# Patient Record
Sex: FEMALE | Race: WHITE | NOT HISPANIC OR LATINO | Employment: UNEMPLOYED | URBAN - METROPOLITAN AREA
[De-identification: names, ages, dates, MRNs, and addresses within clinical notes are randomized per-mention and may not be internally consistent; named-entity substitution may affect disease eponyms.]

---

## 2018-03-15 ENCOUNTER — HISTORICAL (OUTPATIENT)
Dept: LAB | Facility: HOSPITAL | Age: 33
End: 2018-03-15

## 2018-03-15 LAB
ABS NEUT (OLG): 5.5 X10(3)/MCL (ref 1.5–6.9)
ERYTHROCYTE [DISTWIDTH] IN BLOOD BY AUTOMATED COUNT: 13.1 % (ref 11.5–17)
GROUP & RH: NORMAL
HBV SURFACE AG SERPL QL IA: NEGATIVE
HCT VFR BLD AUTO: 33.5 % (ref 36–48)
HGB BLD-MCNC: 10.9 GM/DL (ref 12–16)
HIV 1+2 AB+HIV1 P24 AG SERPL QL IA: NEGATIVE
MCH RBC QN AUTO: 27 PG (ref 27–34)
MCHC RBC AUTO-ENTMCNC: 32 GM/DL (ref 31–36)
MCV RBC AUTO: 84 FL (ref 80–99)
PLATELET # BLD AUTO: 257 X10(3)/MCL (ref 140–400)
PMV BLD AUTO: 9.1 FL (ref 6.8–10)
RBC # BLD AUTO: 4.01 X10(6)/MCL (ref 4.2–5.4)
T PALLIDUM AB SER QL: NONREACTIVE
WBC # SPEC AUTO: 8.8 X10(3)/MCL (ref 4.5–11.5)

## 2018-07-05 ENCOUNTER — HISTORICAL (OUTPATIENT)
Dept: LAB | Facility: HOSPITAL | Age: 33
End: 2018-07-05

## 2018-07-07 LAB — FINAL CULTURE: NO GROWTH

## 2018-07-11 ENCOUNTER — HISTORICAL (OUTPATIENT)
Dept: LAB | Facility: HOSPITAL | Age: 33
End: 2018-07-11

## 2018-07-11 LAB
ABS NEUT (OLG): 7.6 X10(3)/MCL (ref 1.5–6.9)
ALT SERPL-CCNC: 23 UNIT/L (ref 10–45)
AST SERPL-CCNC: 26 UNIT/L (ref 15–37)
ERYTHROCYTE [DISTWIDTH] IN BLOOD BY AUTOMATED COUNT: 13.7 % (ref 11.5–17)
HBV SURFACE AG SERPL QL IA: NEGATIVE
HCT VFR BLD AUTO: 33.5 % (ref 36–48)
HGB BLD-MCNC: 10.5 GM/DL (ref 12–16)
HIV 1+2 AB+HIV1 P24 AG SERPL QL IA: NEGATIVE
LDH SERPL-CCNC: 200 UNIT/L (ref 112–240)
MCH RBC QN AUTO: 25 PG (ref 27–34)
MCHC RBC AUTO-ENTMCNC: 31 GM/DL (ref 31–36)
MCV RBC AUTO: 81 FL (ref 80–99)
PLATELET # BLD AUTO: 383 X10(3)/MCL (ref 140–400)
PMV BLD AUTO: 9.9 FL (ref 6.8–10)
RBC # BLD AUTO: 4.13 X10(6)/MCL (ref 4.2–5.4)
T PALLIDUM AB SER QL: NONREACTIVE
URATE SERPL-MCNC: 5 MG/DL (ref 2.6–7.2)
WBC # SPEC AUTO: 11.6 X10(3)/MCL (ref 4.5–11.5)

## 2019-09-12 ENCOUNTER — HISTORICAL (OUTPATIENT)
Dept: LAB | Facility: HOSPITAL | Age: 34
End: 2019-09-12

## 2019-09-12 LAB
ABS NEUT (OLG): 2.5 X10(3)/MCL (ref 1.5–6.9)
ALBUMIN SERPL-MCNC: 3.2 GM/DL (ref 3.4–5)
ALBUMIN/GLOB SERPL: 0.8 RATIO
ALP SERPL-CCNC: 56 UNIT/L (ref 30–113)
ALT SERPL-CCNC: 15 UNIT/L (ref 10–45)
AST SERPL-CCNC: 18 UNIT/L (ref 15–37)
BILIRUB SERPL-MCNC: 0.1 MG/DL (ref 0.1–0.9)
BILIRUBIN DIRECT+TOT PNL SERPL-MCNC: <0.05 MG/DL (ref 0–0.3)
BILIRUBIN DIRECT+TOT PNL SERPL-MCNC: >0.05 MG/DL
BUN SERPL-MCNC: 8 MG/DL (ref 10–20)
CALCIUM SERPL-MCNC: 8.9 MG/DL (ref 8–10.5)
CHLORIDE SERPL-SCNC: 106 MMOL/L (ref 100–108)
CO2 SERPL-SCNC: 31 MMOL/L (ref 21–35)
CREAT SERPL-MCNC: 0.79 MG/DL (ref 0.7–1.3)
ERYTHROCYTE [DISTWIDTH] IN BLOOD BY AUTOMATED COUNT: 13.8 % (ref 11.5–17)
GLOBULIN SER-MCNC: 3.8 GM/DL
GLUCOSE SERPL-MCNC: 99 MG/DL (ref 75–116)
HCT VFR BLD AUTO: 35.9 % (ref 36–48)
HGB BLD-MCNC: 11.4 GM/DL (ref 12–16)
MCH RBC QN AUTO: 26 PG (ref 27–34)
MCHC RBC AUTO-ENTMCNC: 32 GM/DL (ref 31–36)
MCV RBC AUTO: 81 FL (ref 80–99)
PLATELET # BLD AUTO: 254 X10(3)/MCL (ref 140–400)
PMV BLD AUTO: 8.4 FL (ref 6.8–10)
POTASSIUM SERPL-SCNC: 3.5 MMOL/L (ref 3.6–5.2)
PROT SERPL-MCNC: 7 GM/DL (ref 6.4–8.2)
RBC # BLD AUTO: 4.42 X10(6)/MCL (ref 4.2–5.4)
SODIUM SERPL-SCNC: 142 MMOL/L (ref 135–145)
WBC # SPEC AUTO: 6.9 X10(3)/MCL (ref 4.5–11.5)

## 2020-05-25 ENCOUNTER — HISTORICAL (OUTPATIENT)
Dept: LAB | Facility: HOSPITAL | Age: 35
End: 2020-05-25

## 2020-05-25 LAB
ALBUMIN SERPL-MCNC: 3.8 GM/DL (ref 3.5–5)
ALBUMIN/GLOB SERPL: 1 RATIO (ref 1.1–2)
ALP SERPL-CCNC: 53 UNIT/L (ref 40–150)
ALT SERPL-CCNC: 18 UNIT/L (ref 0–55)
AST SERPL-CCNC: 23 UNIT/L (ref 5–34)
BILIRUB SERPL-MCNC: 0.4 MG/DL
BILIRUBIN DIRECT+TOT PNL SERPL-MCNC: 0.2 MG/DL (ref 0–0.5)
BILIRUBIN DIRECT+TOT PNL SERPL-MCNC: 0.2 MG/DL (ref 0–0.8)
BUN SERPL-MCNC: 14 MG/DL (ref 7–18.7)
CALCIUM SERPL-MCNC: 9.1 MG/DL (ref 8.4–10.2)
CHLORIDE SERPL-SCNC: 104 MMOL/L (ref 98–107)
CO2 SERPL-SCNC: 24 MMOL/L (ref 22–29)
CREAT SERPL-MCNC: 0.87 MG/DL (ref 0.55–1.02)
GLOBULIN SER-MCNC: 4 GM/DL (ref 2.4–3.5)
GLUCOSE SERPL-MCNC: 106 MG/DL (ref 74–100)
POTASSIUM SERPL-SCNC: 3.5 MMOL/L (ref 3.5–5.1)
PROT SERPL-MCNC: 7.8 GM/DL (ref 6.4–8.3)
SODIUM SERPL-SCNC: 136 MMOL/L (ref 136–145)

## 2020-10-19 ENCOUNTER — HISTORICAL (OUTPATIENT)
Dept: LAB | Facility: HOSPITAL | Age: 35
End: 2020-10-19

## 2020-10-19 LAB
ABS NEUT (OLG): 3 X10(3)/MCL (ref 1.5–6.9)
ALBUMIN SERPL-MCNC: 3.7 GM/DL (ref 3.5–5)
ALBUMIN/GLOB SERPL: 0.8 RATIO (ref 1.1–2)
ALP SERPL-CCNC: 62 UNIT/L (ref 40–150)
ALT SERPL-CCNC: 62 UNIT/L (ref 0–55)
AST SERPL-CCNC: 38 UNIT/L (ref 5–34)
BILIRUB SERPL-MCNC: 0.1 MG/DL
BILIRUBIN DIRECT+TOT PNL SERPL-MCNC: 0 MG/DL (ref 0–0.8)
BILIRUBIN DIRECT+TOT PNL SERPL-MCNC: 0.1 MG/DL (ref 0–0.5)
BUN SERPL-MCNC: 15 MG/DL (ref 7–18.7)
CALCIUM SERPL-MCNC: 9.5 MG/DL (ref 8.4–10.2)
CHLORIDE SERPL-SCNC: 102 MMOL/L (ref 98–107)
CO2 SERPL-SCNC: 30 MMOL/L (ref 22–29)
CREAT SERPL-MCNC: 0.89 MG/DL (ref 0.55–1.02)
ERYTHROCYTE [DISTWIDTH] IN BLOOD BY AUTOMATED COUNT: 12.9 % (ref 11.5–17)
GLOBULIN SER-MCNC: 4.6 GM/DL (ref 2.4–3.5)
GLUCOSE SERPL-MCNC: 92 MG/DL (ref 74–100)
HCT VFR BLD AUTO: 37.2 % (ref 36–48)
HGB BLD-MCNC: 11.8 GM/DL (ref 12–16)
MCH RBC QN AUTO: 29 PG (ref 27–34)
MCHC RBC AUTO-ENTMCNC: 32 GM/DL (ref 31–36)
MCV RBC AUTO: 90 FL (ref 80–99)
PLATELET # BLD AUTO: 370 X10(3)/MCL (ref 140–400)
PMV BLD AUTO: 8.9 FL (ref 6.8–10)
POTASSIUM SERPL-SCNC: 4.5 MMOL/L (ref 3.5–5.1)
PROT SERPL-MCNC: 8.3 GM/DL (ref 6.4–8.3)
RBC # BLD AUTO: 4.12 X10(6)/MCL (ref 4.2–5.4)
SODIUM SERPL-SCNC: 142 MMOL/L (ref 136–145)
WBC # SPEC AUTO: 7.3 X10(3)/MCL (ref 4.5–11.5)

## 2021-04-05 ENCOUNTER — HISTORICAL (OUTPATIENT)
Dept: RADIOLOGY | Facility: HOSPITAL | Age: 36
End: 2021-04-05

## 2021-07-22 ENCOUNTER — HISTORICAL (OUTPATIENT)
Dept: RADIOLOGY | Facility: HOSPITAL | Age: 36
End: 2021-07-22

## 2021-12-16 ENCOUNTER — HISTORICAL (OUTPATIENT)
Dept: LAB | Facility: HOSPITAL | Age: 36
End: 2021-12-16

## 2021-12-16 LAB
ABS NEUT (OLG): 5.2 X10(3)/MCL (ref 1.5–6.9)
ALBUMIN SERPL-MCNC: 3.6 GM/DL (ref 3.5–5)
ALBUMIN/GLOB SERPL: 1.1 RATIO (ref 1.1–2)
ALP SERPL-CCNC: 41 UNIT/L (ref 40–150)
ALT SERPL-CCNC: 15 UNIT/L (ref 0–55)
AST SERPL-CCNC: 20 UNIT/L (ref 5–34)
BILIRUB SERPL-MCNC: 0.1 MG/DL
BILIRUBIN DIRECT+TOT PNL SERPL-MCNC: 0 MG/DL (ref 0–0.8)
BILIRUBIN DIRECT+TOT PNL SERPL-MCNC: 0.1 MG/DL (ref 0–0.5)
BUN SERPL-MCNC: 11 MG/DL (ref 7–18.7)
CALCIUM SERPL-MCNC: 9.2 MG/DL (ref 8.7–10.5)
CHLORIDE SERPL-SCNC: 108 MMOL/L (ref 98–107)
CO2 SERPL-SCNC: 24 MMOL/L (ref 22–29)
CREAT SERPL-MCNC: 0.81 MG/DL (ref 0.55–1.02)
ERYTHROCYTE [DISTWIDTH] IN BLOOD BY AUTOMATED COUNT: 12.5 % (ref 11.5–17)
GLOBULIN SER-MCNC: 3.4 GM/DL (ref 2.4–3.5)
GLUCOSE SERPL-MCNC: 86 MG/DL (ref 74–100)
HCT VFR BLD AUTO: 34.1 % (ref 36–48)
HGB BLD-MCNC: 10.9 GM/DL (ref 12–16)
MCH RBC QN AUTO: 29 PG (ref 27–34)
MCHC RBC AUTO-ENTMCNC: 32 GM/DL (ref 31–36)
MCV RBC AUTO: 92 FL (ref 80–99)
PLATELET # BLD AUTO: 274 X10(3)/MCL (ref 140–400)
PMV BLD AUTO: 9.3 FL (ref 6.8–10)
POTASSIUM SERPL-SCNC: 4.7 MMOL/L (ref 3.5–5.1)
PROT SERPL-MCNC: 7 GM/DL (ref 6.4–8.3)
RBC # BLD AUTO: 3.71 X10(6)/MCL (ref 4.2–5.4)
SODIUM SERPL-SCNC: 141 MMOL/L (ref 136–145)
WBC # SPEC AUTO: 8.1 X10(3)/MCL (ref 4.5–11.5)

## 2022-03-25 ENCOUNTER — HISTORICAL (OUTPATIENT)
Dept: MEDSURG UNIT | Facility: HOSPITAL | Age: 37
End: 2022-03-25

## 2022-03-25 LAB
ABS NEUT (OLG): 2.23
ALBUMIN SERPL-MCNC: 3.9 G/DL (ref 3.5–5)
ALBUMIN/GLOB SERPL: 1 {RATIO} (ref 1.1–2)
ALP SERPL-CCNC: 66 U/L (ref 40–150)
ALT SERPL-CCNC: 31 U/L (ref 0–55)
AST SERPL-CCNC: 26 U/L (ref 5–34)
B-HCG SERPL QL: NEGATIVE
BASOPHILS NFR BLD MANUAL: 1 % (ref 0–2)
BILIRUB SERPL-MCNC: 0.2 MG/DL
BILIRUBIN DIRECT+TOT PNL SERPL-MCNC: 0.1
BILIRUBIN DIRECT+TOT PNL SERPL-MCNC: 0.1 (ref 0–0.5)
BUN SERPL-MCNC: 11 MG/DL (ref 7–18.7)
CALCIUM SERPL-MCNC: 9.3 MG/DL (ref 8.7–10.5)
CHLORIDE SERPL-SCNC: 105 MMOL/L (ref 98–107)
CO2 SERPL-SCNC: 21 MMOL/L (ref 22–29)
CREAT SERPL-MCNC: 0.72 MG/DL (ref 0.55–1.02)
EOSINOPHIL NFR BLD MANUAL: 9 % (ref 0–8)
ERYTHROCYTE [DISTWIDTH] IN BLOOD BY AUTOMATED COUNT: 13 %
GLOBULIN SER-MCNC: 3.9 G/DL (ref 2.4–3.5)
GLUCOSE SERPL-MCNC: 88 MG/DL (ref 74–100)
GRANULOCYTES NFR BLD MANUAL: 54 % (ref 47–80)
HCG UR QL IA.RAPID: NEGATIVE
HCG UR QL IA.RAPID: POSITIVE
HCT VFR BLD AUTO: 38.4 % (ref 34–46)
HEMOLYSIS INTERF INDEX SERPL-ACNC: 5
HGB BLD-MCNC: 12.8 G/DL (ref 11.3–15.4)
ICTERIC INTERF INDEX SERPL-ACNC: 0
LIPEMIC INTERF INDEX SERPL-ACNC: 1
LYMPHOCYTES NFR BLD MANUAL: 27 % (ref 13–40)
MANUAL DIFF? (OHS): YES
MCH RBC QN AUTO: 29.3 PG (ref 27–33)
MCHC RBC AUTO-ENTMCNC: 33.3 G/DL (ref 32–35)
MCV RBC AUTO: 87.9 FL (ref 81–97)
MONOCYTES NFR BLD MANUAL: 9 % (ref 2–11)
PLATELET # BLD AUTO: 324 10*3/UL (ref 140–450)
PLATELET # BLD EST: ADEQUATE 10*3/UL
PMV BLD AUTO: 9 FL
POTASSIUM SERPL-SCNC: 3 MMOL/L (ref 3.5–5.1)
PROT SERPL-MCNC: 7.8 G/DL (ref 6.4–8.3)
RBC # BLD AUTO: 4.37 10*6/UL (ref 3.9–5)
SARS-COV-2 AG RESP QL IA.RAPID: NEGATIVE
SODIUM SERPL-SCNC: 139 MMOL/L (ref 136–145)
WBC # SPEC AUTO: 5.25 10*3/UL (ref 3.4–9.2)

## 2022-05-14 NOTE — OP NOTE
Patient:   Trena Acosta            MRN: [id number]            FIN: [id number]               Age:   36 years     Sex:  Female     :  1985   Associated Diagnoses:   Lower abdominal pain, unspecified; Constipation, unspecified   Author:   Selene Philippe MD      Operative Note   Operative Information   Date/ Time:  3/25/2022 08:39:00.     Procedures Performed: Procedure Code   Colonoscopy, flexible; diagnostic, including collection of specimen(s) by brushing or washing, when performed (separate procedure) (19136)..     Indications: 36-year-old white female with change in bowel function and lower abdominal cramping with constipation undergoing diagnostic colonoscopy.     Preoperative Diagnosis: Lower abdominal pain, unspecified (EYI76-JH R10.30), Constipation, unspecified (NRW41-HS K59.00).     Postoperative Diagnosis: Lower abdominal pain, unspecified (XMQ52-ID R10.30), Constipation, unspecified (AIK46-YS K59.00).     Surgeon: Selene Philippe MD.     Anesthesia: Intravenous MAC.     Speciman Removed: None.     Description of Procedure/Findings/    Complications: Patient was brought to the endoscopy suite laid in a left lateral decubitus position right side up.  Intravenous anesthesia was provided.  Digital rectal exam performed exhibiting good anal rectal tone and no masses.  An endoscope was then passed through the anus intubating the rectum and with gentle insufflation reaching the cecum.  Upon reaching the cecum pictures were taken the scope was then slowly withdrawn.  Overall cecum ascending transverse descending and rectosigmoid colon appear to be grossly normal.  Patient did have an overall suboptimal to poor bowel prep extending from the cecum to the mid transverse regions.  Upon reaching the distal rectum 2 attempts were made at retroflexion but unsuccessful.  The lower rectum and perianal regions did appear to be grossly normal during retrieval scope in a neutral position.  Scope was then  removed in neutral position the colon was evacuated of air patient was relieved of anesthesia stable condition and transferred postanesthesia care unit..     Esimated blood loss: No blood loss.     Findings: Overall normal colonoscopy with suboptimal bowel prep.     Complications: None.

## 2022-09-13 ENCOUNTER — HOSPITAL ENCOUNTER (EMERGENCY)
Facility: HOSPITAL | Age: 37
Discharge: HOME | End: 2022-09-13
Attending: FAMILY MEDICINE
Payer: MEDICAID

## 2022-09-13 VITALS
DIASTOLIC BLOOD PRESSURE: 89 MMHG | RESPIRATION RATE: 20 BRPM | WEIGHT: 167 LBS | TEMPERATURE: 98 F | HEART RATE: 63 BPM | BODY MASS INDEX: 36.03 KG/M2 | HEIGHT: 57 IN | OXYGEN SATURATION: 98 % | SYSTOLIC BLOOD PRESSURE: 157 MMHG

## 2022-09-13 DIAGNOSIS — S39.012A LUMBAR STRAIN, INITIAL ENCOUNTER: Primary | ICD-10-CM

## 2022-09-13 LAB
ALBUMIN SERPL-MCNC: 4.2 GM/DL (ref 3.5–5)
ALBUMIN/GLOB SERPL: 1.1 RATIO (ref 1.1–2)
ALP SERPL-CCNC: 41 UNIT/L (ref 40–150)
ALT SERPL-CCNC: 16 UNIT/L (ref 0–55)
APPEARANCE UR: ABNORMAL
AST SERPL-CCNC: 18 UNIT/L (ref 5–34)
B-HCG SERPL QL: NEGATIVE
BACTERIA #/AREA URNS AUTO: ABNORMAL /HPF
BASOPHILS # BLD AUTO: 0.05 X10(3)/MCL (ref 0–0.2)
BASOPHILS NFR BLD AUTO: 0.7 %
BILIRUB UR QL STRIP.AUTO: NEGATIVE MG/DL
BILIRUBIN DIRECT+TOT PNL SERPL-MCNC: 0.3 MG/DL
BUN SERPL-MCNC: 12 MG/DL (ref 7–18.7)
CALCIUM SERPL-MCNC: 9.6 MG/DL (ref 8.4–10.2)
CHLORIDE SERPL-SCNC: 106 MMOL/L (ref 98–107)
CO2 SERPL-SCNC: 26 MMOL/L (ref 22–29)
COLOR UR AUTO: YELLOW
CREAT SERPL-MCNC: 0.74 MG/DL (ref 0.55–1.02)
EOSINOPHIL # BLD AUTO: 0.32 X10(3)/MCL (ref 0–0.9)
EOSINOPHIL NFR BLD AUTO: 4.4 %
ERYTHROCYTE [DISTWIDTH] IN BLOOD BY AUTOMATED COUNT: 13.3 % (ref 11.5–17)
FLUAV AG UPPER RESP QL IA.RAPID: NOT DETECTED
FLUBV AG UPPER RESP QL IA.RAPID: NOT DETECTED
GFR SERPLBLD CREATININE-BSD FMLA CKD-EPI: >60 MLS/MIN/1.73/M2
GLOBULIN SER-MCNC: 3.9 GM/DL (ref 2.4–3.5)
GLUCOSE SERPL-MCNC: 92 MG/DL (ref 74–100)
GLUCOSE UR QL STRIP.AUTO: NEGATIVE MG/DL
HCT VFR BLD AUTO: 36.9 % (ref 37–47)
HGB BLD-MCNC: 12 GM/DL (ref 12–16)
IMM GRANULOCYTES # BLD AUTO: 0.1 X10(3)/MCL (ref 0–0.04)
IMM GRANULOCYTES NFR BLD AUTO: 1.4 %
KETONES UR QL STRIP.AUTO: NEGATIVE MG/DL
LEUKOCYTE ESTERASE UR QL STRIP.AUTO: NEGATIVE UNIT/L
LYMPHOCYTES # BLD AUTO: 2.16 X10(3)/MCL (ref 0.6–4.6)
LYMPHOCYTES NFR BLD AUTO: 30 %
MCH RBC QN AUTO: 29.3 PG (ref 27–31)
MCHC RBC AUTO-ENTMCNC: 32.5 MG/DL (ref 33–36)
MCV RBC AUTO: 90 FL (ref 80–94)
MONOCYTES # BLD AUTO: 0.42 X10(3)/MCL (ref 0.1–1.3)
MONOCYTES NFR BLD AUTO: 5.8 %
NEUTROPHILS # BLD AUTO: 4.2 X10(3)/MCL (ref 2.1–9.2)
NEUTROPHILS NFR BLD AUTO: 57.7 %
NITRITE UR QL STRIP.AUTO: NEGATIVE
PH UR STRIP.AUTO: 6 [PH]
PLATELET # BLD AUTO: 278 X10(3)/MCL (ref 130–400)
PMV BLD AUTO: 8.4 FL (ref 7.4–10.4)
POTASSIUM SERPL-SCNC: 3.9 MMOL/L (ref 3.5–5.1)
PROT SERPL-MCNC: 8.1 GM/DL (ref 6.4–8.3)
PROT UR QL STRIP.AUTO: ABNORMAL MG/DL
RBC # BLD AUTO: 4.1 X10(6)/MCL (ref 4.2–5.4)
RBC #/AREA URNS AUTO: ABNORMAL /HPF
RBC UR QL AUTO: ABNORMAL UNIT/L
SARS-COV-2 RNA RESP QL NAA+PROBE: NOT DETECTED
SODIUM SERPL-SCNC: 138 MMOL/L (ref 136–145)
SP GR UR STRIP.AUTO: 1.02
SQUAMOUS #/AREA URNS AUTO: ABNORMAL /HPF
UROBILINOGEN UR STRIP-ACNC: 0.2 MG/DL
WBC # SPEC AUTO: 7.2 X10(3)/MCL (ref 4.5–11.5)
WBC #/AREA URNS AUTO: ABNORMAL /HPF

## 2022-09-13 PROCEDURE — 36415 COLL VENOUS BLD VENIPUNCTURE: CPT | Performed by: NURSE PRACTITIONER

## 2022-09-13 PROCEDURE — 99285 EMERGENCY DEPT VISIT HI MDM: CPT | Mod: 25

## 2022-09-13 PROCEDURE — 80053 COMPREHEN METABOLIC PANEL: CPT | Performed by: NURSE PRACTITIONER

## 2022-09-13 PROCEDURE — 63600175 PHARM REV CODE 636 W HCPCS: Performed by: NURSE PRACTITIONER

## 2022-09-13 PROCEDURE — 87636 SARSCOV2 & INF A&B AMP PRB: CPT | Performed by: NURSE PRACTITIONER

## 2022-09-13 PROCEDURE — 81025 URINE PREGNANCY TEST: CPT | Performed by: NURSE PRACTITIONER

## 2022-09-13 PROCEDURE — 81001 URINALYSIS AUTO W/SCOPE: CPT | Performed by: NURSE PRACTITIONER

## 2022-09-13 PROCEDURE — 85025 COMPLETE CBC W/AUTO DIFF WBC: CPT | Performed by: NURSE PRACTITIONER

## 2022-09-13 PROCEDURE — 96372 THER/PROPH/DIAG INJ SC/IM: CPT | Performed by: NURSE PRACTITIONER

## 2022-09-13 RX ORDER — METHOCARBAMOL 750 MG/1
1500 TABLET, FILM COATED ORAL 3 TIMES DAILY
Qty: 30 TABLET | Refills: 0 | Status: SHIPPED | OUTPATIENT
Start: 2022-09-13 | End: 2022-09-18

## 2022-09-13 RX ORDER — KETOROLAC TROMETHAMINE 30 MG/ML
30 INJECTION, SOLUTION INTRAMUSCULAR; INTRAVENOUS ONCE
Status: COMPLETED | OUTPATIENT
Start: 2022-09-13 | End: 2022-09-13

## 2022-09-13 RX ORDER — ORPHENADRINE CITRATE 30 MG/ML
60 INJECTION INTRAMUSCULAR; INTRAVENOUS
Status: COMPLETED | OUTPATIENT
Start: 2022-09-13 | End: 2022-09-13

## 2022-09-13 RX ADMIN — KETOROLAC TROMETHAMINE 30 MG: 30 INJECTION, SOLUTION INTRAMUSCULAR at 02:09

## 2022-09-13 RX ADMIN — ORPHENADRINE CITRATE 60 MG: 60 INJECTION INTRAMUSCULAR; INTRAVENOUS at 02:09

## 2022-09-13 NOTE — ED PROVIDER NOTES
Encounter Date: 2022       History     Chief Complaint   Patient presents with    Back Pain     C/o lower back pain for 3 days. States feels like pulled a muscle     Patient is a 37-year-old  female who presents to the emergency department with complaints of right lower back pains for 3 days.  Patient does report that she was doing some mopping around this same time.  But thinks that the back pain had started prior to her mopping.  Denies any other known injury except she has been working out more lately.  She has had no fevers chills.  She denies any nausea vomiting diarrhea.  She has no cough headache or upper respiratory symptoms.  She denies any troubles with urination.    Review of patient's allergies indicates:   Allergen Reactions    Codeine Itching and Swelling     Past Medical History:   Diagnosis Date    Opioid withdrawal      Past Surgical History:   Procedure Laterality Date     SECTION       History reviewed. No pertinent family history.  Social History     Tobacco Use    Smoking status: Never    Smokeless tobacco: Never   Substance Use Topics    Alcohol use: Not Currently     Review of Systems   Constitutional:  Positive for fatigue. Negative for fever.   HENT:  Negative for sore throat.    Respiratory:  Negative for shortness of breath.    Cardiovascular:  Negative for chest pain.   Gastrointestinal:  Negative for nausea.   Genitourinary:  Negative for dysuria.   Musculoskeletal:  Positive for back pain. Negative for arthralgias, gait problem and joint swelling.   Skin:  Negative for rash.   Neurological:  Negative for weakness.   Hematological:  Does not bruise/bleed easily.   All other systems reviewed and are negative.    Physical Exam     Initial Vitals [22 1325]   BP Pulse Resp Temp SpO2   (!) 161/90 65 20 98.1 °F (36.7 °C) 99 %      MAP       --         Physical Exam    Nursing note and vitals reviewed.  Constitutional: Vital signs are normal. She appears  well-developed and well-nourished.  Non-toxic appearance. She does not have a sickly appearance.   HENT:   Head: Normocephalic and atraumatic.   Eyes: Conjunctivae, EOM and lids are normal. Lids are everted and swept, no foreign bodies found.   Neck: Trachea normal and phonation normal. Neck supple. No thyroid mass and no thyromegaly present.   Normal range of motion.   Full passive range of motion without pain.     Cardiovascular:  Normal rate, regular rhythm, S1 normal, S2 normal, normal heart sounds, intact distal pulses and normal pulses.           Abdominal: Abdomen is soft.   Musculoskeletal:      Cervical back: Full passive range of motion without pain, normal range of motion and neck supple.      Comments: Tenderness palpation on the right lower paraspinal back.  There is no mass or bulging.  There is no laceration or sores.  There is no rash.     Lymphadenopathy:     She has no cervical adenopathy.   Neurological: She is alert and oriented to person, place, and time. She has normal strength and normal reflexes.   Skin: Skin is warm, dry and intact. Capillary refill takes less than 2 seconds.   Psychiatric: She has a normal mood and affect. Her speech is normal and behavior is normal. Judgment normal. Cognition and memory are normal.       ED Course   Procedures  Labs Reviewed   URINALYSIS, REFLEX TO URINE CULTURE - Abnormal; Notable for the following components:       Result Value    Appearance, UA Cloudy (*)     Protein, UA Trace (*)     Blood, UA Trace-Intact (*)     All other components within normal limits   URINALYSIS, MICROSCOPIC - Abnormal; Notable for the following components:    Bacteria, UA Few (*)     Squamous Epithelial Cells, UA Moderate (*)     All other components within normal limits   COMPREHENSIVE METABOLIC PANEL - Abnormal; Notable for the following components:    Globulin 3.9 (*)     All other components within normal limits   CBC WITH DIFFERENTIAL - Abnormal; Notable for the following  components:    RBC 4.10 (*)     Hct 36.9 (*)     MCHC 32.5 (*)     IG# 0.10 (*)     All other components within normal limits   HCG QUALITATIVE URINE - Normal   COVID/FLU A&B PCR - Normal   CBC W/ AUTO DIFFERENTIAL    Narrative:     The following orders were created for panel order CBC auto differential.  Procedure                               Abnormality         Status                     ---------                               -----------         ------                     CBC with Differential[[id number]]        Abnormal            Final result                 Please view results for these tests on the individual orders.          Imaging Results              CT Renal Stone Study ABD Pelvis WO (Final result)  Result time 09/13/22 15:26:09      Final result by Jamshid Kinney MD (09/13/22 15:26:09)                   Impression:      1. No obstructive uropathy identified.  No renal or ureteral stone identified  2. Radiopaque capsules present within the small and large bowel.  Clinical correlation is indicated.  3. Small fatty umbilical hernia  4. Mucosal prominence versus underdistention of the sigmoid colon.      Electronically signed by: Jamshid Kinney  Date:    09/13/2022  Time:    15:26               Narrative:    EXAMINATION:  CT RENAL STONE STUDY ABD PELVIS WO    CLINICAL HISTORY:  Flank pain, kidney stone suspected;, .    TECHNIQUE:  PATIENT RADIATION DOSE: DLP(mGycm) 483.10    As per PQRS measures, all CT scans at this facility used dose modulation, iterative reconstruction, and/or weight based dose adjustment when appropriate to reduce radiation dose to as low as reasonably achievable.    COMPARISON:  09/11/2021    FINDINGS:  Serial axial images obtained of the abdomen without the administration of IV or oral contrast.  Additional sagittal and coronal reconstructions were performed.The heart is normal in size.  The lung bases are relatively clear.  A small calcified granulomas evident the left lobe of the  liver.  The spleen, adrenal glands, pancreas, and gallbladder grossly within normal limits without the administration of IV contrast.  The kidneys are relatively symmetric in size.  No hydronephrosis is seen.  No renal or ureteral stone identified.  Radiopaque capsules are present within the loops of small bowel at the left abdomen and sigmoid colon.  Feces is scattered throughout the colon.  An appendicolith is present within a normal sized appendix.  No dilated loops of bowel are identified.  The uterus is normal in size.  No significant free fluid collection is seen.  There is mucosal prominence versus underdistention at the sigmoid colon.                                       Medications   ketorolac injection 30 mg (30 mg Intramuscular Given 9/13/22 1432)   orphenadrine injection 60 mg (60 mg Intramuscular Given 9/13/22 1432)     Medical Decision Making:   Initial Assessment:   Patient appeared to have a normal or street for lumbar strain however she is managing that she is concerned about cancer and her kidneys and her appendix.  She states that she is concerned about this because she has felt fatigued.  She denies any other symptoms.  She does have family history of Ca but does not specify what type.  Will do lab work with CT to rule out stone due to urines containing microscopic blood which is concerns for renal stone and she does have positive CVA tenderness to the right.  Will the throat plan of care once his results have been obtained.           ED Course as of 09/13/22 1602 Tue Sep 13, 2022   1601 Discussed normal results with exception of trace blood seen and urinary tract.  This patient does report relief of symptoms moderately after IM injections.  Discussed strict ER return precautions.  Discussed treatment of her lumbar strain including heat ice and medications prescribed.  Patient was aware the plan at this time and had no other questions concerns prior to discharge. [SL]      ED Course User  Index  [SL] PRASHANTH Baer                 Clinical Impression:   Final diagnoses:  [S39.012A] Lumbar strain, initial encounter (Primary)      ED Disposition Condition    Discharge Stable          ED Prescriptions       Medication Sig Dispense Start Date End Date Auth. Provider    methocarbamoL (ROBAXIN) 750 MG Tab Take 2 tablets (1,500 mg total) by mouth 3 (three) times daily. for 5 days 30 tablet 9/13/2022 9/18/2022 PRASHANTH Baer          Follow-up Information       Follow up With Specialties Details Why Contact Info    Diya Sommers MD Family Medicine Call in 3 days As needed, For ER Follow Up. [address]  [place] [address]  [phone]               PRASHANTH Baer  09/13/22 1608

## 2023-01-17 ENCOUNTER — LAB VISIT (OUTPATIENT)
Dept: LAB | Facility: HOSPITAL | Age: 38
End: 2023-01-17
Attending: GENERAL PRACTICE
Payer: MEDICAID

## 2023-01-17 DIAGNOSIS — F11.21 NARCOTIC DEPENDENCE, IN REMISSION: Primary | ICD-10-CM

## 2023-01-17 LAB
ALBUMIN SERPL-MCNC: 4 G/DL (ref 3.5–5)
ALBUMIN/GLOB SERPL: 1.1 RATIO (ref 1.1–2)
ALP SERPL-CCNC: 44 UNIT/L (ref 40–150)
ALT SERPL-CCNC: 19 UNIT/L (ref 0–55)
AST SERPL-CCNC: 23 UNIT/L (ref 5–34)
BILIRUBIN DIRECT+TOT PNL SERPL-MCNC: 0.2 MG/DL
BUN SERPL-MCNC: 13 MG/DL (ref 7–18.7)
CALCIUM SERPL-MCNC: 9.2 MG/DL (ref 8.4–10.2)
CHLORIDE SERPL-SCNC: 107 MMOL/L (ref 98–107)
CO2 SERPL-SCNC: 24 MMOL/L (ref 22–29)
CREAT SERPL-MCNC: 0.73 MG/DL (ref 0.55–1.02)
ERYTHROCYTE [DISTWIDTH] IN BLOOD BY AUTOMATED COUNT: 12.8 % (ref 11.5–17)
GFR SERPLBLD CREATININE-BSD FMLA CKD-EPI: >60 MLS/MIN/1.73/M2
GLOBULIN SER-MCNC: 3.8 GM/DL (ref 2.4–3.5)
GLUCOSE SERPL-MCNC: 88 MG/DL (ref 74–100)
HCT VFR BLD AUTO: 38.4 % (ref 37–47)
HGB BLD-MCNC: 12.3 GM/DL (ref 12–16)
MCH RBC QN AUTO: 28.7 PG
MCHC RBC AUTO-ENTMCNC: 32 MG/DL (ref 33–36)
MCV RBC AUTO: 89.5 FL (ref 80–94)
PLATELET # BLD AUTO: 304 X10(3)/MCL (ref 130–400)
PMV BLD AUTO: 8.8 FL (ref 7.4–10.4)
POTASSIUM SERPL-SCNC: 4.1 MMOL/L (ref 3.5–5.1)
PROT SERPL-MCNC: 7.8 GM/DL (ref 6.4–8.3)
RBC # BLD AUTO: 4.29 X10(6)/MCL (ref 4.2–5.4)
SODIUM SERPL-SCNC: 138 MMOL/L (ref 136–145)
WBC # SPEC AUTO: 7.4 X10(3)/MCL (ref 4.5–11.5)

## 2023-01-17 PROCEDURE — 80053 COMPREHEN METABOLIC PANEL: CPT

## 2023-01-17 PROCEDURE — 36415 COLL VENOUS BLD VENIPUNCTURE: CPT

## 2023-01-17 PROCEDURE — 85027 COMPLETE CBC AUTOMATED: CPT

## 2023-03-29 ENCOUNTER — HOSPITAL ENCOUNTER (EMERGENCY)
Facility: HOSPITAL | Age: 38
Discharge: HOME | End: 2023-03-29
Attending: INTERNAL MEDICINE
Payer: MEDICAID

## 2023-03-29 ENCOUNTER — HOSPITAL ENCOUNTER (EMERGENCY)
Facility: HOSPITAL | Age: 38
Discharge: HOME | End: 2023-03-29
Attending: EMERGENCY MEDICINE
Payer: MEDICAID

## 2023-03-29 VITALS
TEMPERATURE: 98 F | WEIGHT: 166.63 LBS | RESPIRATION RATE: 16 BRPM | HEART RATE: 80 BPM | DIASTOLIC BLOOD PRESSURE: 101 MMHG | BODY MASS INDEX: 35.95 KG/M2 | SYSTOLIC BLOOD PRESSURE: 141 MMHG | HEIGHT: 57 IN | OXYGEN SATURATION: 100 %

## 2023-03-29 VITALS
BODY MASS INDEX: 36.2 KG/M2 | WEIGHT: 167.81 LBS | RESPIRATION RATE: 18 BRPM | DIASTOLIC BLOOD PRESSURE: 88 MMHG | SYSTOLIC BLOOD PRESSURE: 163 MMHG | HEIGHT: 57 IN | TEMPERATURE: 98 F | OXYGEN SATURATION: 100 % | HEART RATE: 70 BPM

## 2023-03-29 DIAGNOSIS — U07.1 COVID-19 VIRUS INFECTION: ICD-10-CM

## 2023-03-29 DIAGNOSIS — F41.9 ANXIETY: Primary | ICD-10-CM

## 2023-03-29 PROBLEM — J45.909 ASTHMA: Status: ACTIVE | Noted: 2023-03-29

## 2023-03-29 LAB
ALBUMIN SERPL-MCNC: 4.1 G/DL (ref 3.5–5)
ALBUMIN/GLOB SERPL: 1 RATIO (ref 1.1–2)
ALP SERPL-CCNC: 47 UNIT/L (ref 40–150)
ALT SERPL-CCNC: 21 UNIT/L (ref 0–55)
APPEARANCE UR: CLEAR
AST SERPL-CCNC: 26 UNIT/L (ref 5–34)
B-HCG SERPL QL: NEGATIVE
BACTERIA #/AREA URNS AUTO: ABNORMAL /HPF
BASOPHILS # BLD AUTO: 0.02 X10(3)/MCL (ref 0–0.2)
BASOPHILS NFR BLD AUTO: 0.4 %
BILIRUB UR QL STRIP.AUTO: NEGATIVE MG/DL
BILIRUBIN DIRECT+TOT PNL SERPL-MCNC: 0.2 MG/DL
BUN SERPL-MCNC: 6 MG/DL (ref 7–18.7)
CALCIUM SERPL-MCNC: 10.1 MG/DL (ref 8.4–10.2)
CHLORIDE SERPL-SCNC: 106 MMOL/L (ref 98–107)
CO2 SERPL-SCNC: 22 MMOL/L (ref 22–29)
COLOR UR AUTO: YELLOW
CREAT SERPL-MCNC: 0.75 MG/DL (ref 0.55–1.02)
EOSINOPHIL # BLD AUTO: 0.04 X10(3)/MCL (ref 0–0.9)
EOSINOPHIL NFR BLD AUTO: 0.7 %
ERYTHROCYTE [DISTWIDTH] IN BLOOD BY AUTOMATED COUNT: 13 % (ref 11.5–17)
GFR SERPLBLD CREATININE-BSD FMLA CKD-EPI: >60 MLS/MIN/1.73/M2
GLOBULIN SER-MCNC: 4.2 GM/DL (ref 2.4–3.5)
GLUCOSE SERPL-MCNC: 115 MG/DL (ref 74–100)
GLUCOSE UR QL STRIP.AUTO: NEGATIVE MG/DL
HCT VFR BLD AUTO: 36.1 % (ref 37–47)
HGB BLD-MCNC: 11.9 G/DL (ref 12–16)
IMM GRANULOCYTES # BLD AUTO: 0.08 X10(3)/MCL (ref 0–0.04)
IMM GRANULOCYTES NFR BLD AUTO: 1.4 %
KETONES UR QL STRIP.AUTO: ABNORMAL MG/DL
LEUKOCYTE ESTERASE UR QL STRIP.AUTO: NEGATIVE UNIT/L
LYMPHOCYTES # BLD AUTO: 1.06 X10(3)/MCL (ref 0.6–4.6)
LYMPHOCYTES NFR BLD AUTO: 18.8 %
MCH RBC QN AUTO: 29 PG (ref 27–31)
MCHC RBC AUTO-ENTMCNC: 33 G/DL (ref 33–36)
MCV RBC AUTO: 88 FL (ref 80–94)
MONOCYTES # BLD AUTO: 0.38 X10(3)/MCL (ref 0.1–1.3)
MONOCYTES NFR BLD AUTO: 6.7 %
NEUTROPHILS # BLD AUTO: 4.05 X10(3)/MCL (ref 2.1–9.2)
NEUTROPHILS NFR BLD AUTO: 72 %
NITRITE UR QL STRIP.AUTO: NEGATIVE
PH UR STRIP.AUTO: 6.5 [PH]
PLATELET # BLD AUTO: 261 X10(3)/MCL (ref 130–400)
PMV BLD AUTO: 8.6 FL (ref 7.4–10.4)
POTASSIUM SERPL-SCNC: 3.7 MMOL/L (ref 3.5–5.1)
PROT SERPL-MCNC: 8.3 GM/DL (ref 6.4–8.3)
PROT UR QL STRIP.AUTO: NEGATIVE MG/DL
RBC # BLD AUTO: 4.1 X10(6)/MCL (ref 4.2–5.4)
RBC #/AREA URNS AUTO: ABNORMAL /HPF
RBC UR QL AUTO: ABNORMAL UNIT/L
SODIUM SERPL-SCNC: 138 MMOL/L (ref 136–145)
SP GR UR STRIP.AUTO: 1.01
SQUAMOUS #/AREA URNS AUTO: ABNORMAL /HPF
UROBILINOGEN UR STRIP-ACNC: 0.2 MG/DL
WBC # SPEC AUTO: 5.6 X10(3)/MCL (ref 4.5–11.5)
WBC #/AREA URNS AUTO: ABNORMAL /HPF

## 2023-03-29 PROCEDURE — 93005 ELECTROCARDIOGRAM TRACING: CPT

## 2023-03-29 PROCEDURE — 93010 EKG 12-LEAD: ICD-10-PCS | Mod: ,,, | Performed by: INTERNAL MEDICINE

## 2023-03-29 PROCEDURE — 99284 EMERGENCY DEPT VISIT MOD MDM: CPT | Mod: 25,27

## 2023-03-29 PROCEDURE — 81025 URINE PREGNANCY TEST: CPT | Performed by: INTERNAL MEDICINE

## 2023-03-29 PROCEDURE — 99283 EMERGENCY DEPT VISIT LOW MDM: CPT | Mod: 25

## 2023-03-29 PROCEDURE — 81001 URINALYSIS AUTO W/SCOPE: CPT | Performed by: INTERNAL MEDICINE

## 2023-03-29 PROCEDURE — 85025 COMPLETE CBC W/AUTO DIFF WBC: CPT | Performed by: INTERNAL MEDICINE

## 2023-03-29 PROCEDURE — 80053 COMPREHEN METABOLIC PANEL: CPT | Performed by: INTERNAL MEDICINE

## 2023-03-29 PROCEDURE — 93010 ELECTROCARDIOGRAM REPORT: CPT | Mod: ,,, | Performed by: INTERNAL MEDICINE

## 2023-03-29 PROCEDURE — 63600175 PHARM REV CODE 636 W HCPCS: Performed by: INTERNAL MEDICINE

## 2023-03-29 PROCEDURE — 96372 THER/PROPH/DIAG INJ SC/IM: CPT | Performed by: INTERNAL MEDICINE

## 2023-03-29 RX ORDER — FLUTICASONE PROPIONATE 44 UG/1
2 AEROSOL, METERED RESPIRATORY (INHALATION) 2 TIMES DAILY
COMMUNITY
Start: 2023-01-23

## 2023-03-29 RX ORDER — BUPRENORPHINE HYDROCHLORIDE, NALOXONE HYDROCHLORIDE 8; 2 MG/1; MG/1
1 FILM, SOLUBLE BUCCAL; SUBLINGUAL 2 TIMES DAILY
COMMUNITY
Start: 2023-03-13

## 2023-03-29 RX ORDER — OMEPRAZOLE 40 MG/1
40 CAPSULE, DELAYED RELEASE ORAL DAILY
COMMUNITY
Start: 2023-03-24

## 2023-03-29 RX ORDER — PROCHLORPERAZINE EDISYLATE 5 MG/ML
10 INJECTION INTRAMUSCULAR; INTRAVENOUS
Status: COMPLETED | OUTPATIENT
Start: 2023-03-29 | End: 2023-03-29

## 2023-03-29 RX ORDER — MONTELUKAST SODIUM 10 MG/1
10 TABLET ORAL DAILY
COMMUNITY
Start: 2023-03-28

## 2023-03-29 RX ORDER — LEVOFLOXACIN 500 MG/1
500 TABLET, FILM COATED ORAL DAILY
COMMUNITY
Start: 2023-03-28

## 2023-03-29 RX ORDER — NAPROXEN 500 MG/1
500 TABLET ORAL 2 TIMES DAILY
COMMUNITY
Start: 2023-03-28 | End: 2023-07-15

## 2023-03-29 RX ADMIN — PROCHLORPERAZINE EDISYLATE 10 MG: 5 INJECTION INTRAMUSCULAR; INTRAVENOUS at 08:03

## 2023-03-29 NOTE — ED PROVIDER NOTES
Encounter Date: 3/29/2023  History from patient     History     Chief Complaint   Patient presents with    Vomiting     Reports here twice yesterday for anxious feeling in her stomach and vomiting. States unable to sleep last night     HPI    37 y.o. female,  has a past medical history of Anxiety disorder, unspecified, Asthma, Depression, Hypercholesteremia, and Opioid withdrawal. Presenting with Vomiting (Reports here twice yesterday for anxious feeling in her stomach and vomiting. States unable to sleep last night)       Review of patient's allergies indicates:   Allergen Reactions    Codeine Itching and Swelling     Current Outpatient Medications   Medication Instructions    FLOVENT HFA 44 mcg/actuation inhaler 2 puffs, 2 times daily    levoFLOXacin (LEVAQUIN) 500 mg, Oral, Daily    montelukast (SINGULAIR) 10 mg, Oral, Daily    naproxen (NAPROSYN) 500 mg, Oral, 2 times daily    omeprazole (PRILOSEC) 40 mg, Oral, Daily    SUBOXONE 8-2 mg 1 Film, Sublingual, 2 times daily       Past Medical History:   Diagnosis Date    Anxiety disorder, unspecified     Asthma     Depression     Hypercholesteremia     Opioid withdrawal      Past Surgical History:   Procedure Laterality Date     SECTION       Family History   Problem Relation Age of Onset    Depression Mother     COPD Mother     Hypertension Mother     Cancer Mother     Hypertension Father     Heart disease Father      Social History     Tobacco Use    Smoking status: Never    Smokeless tobacco: Never   Substance Use Topics    Alcohol use: Not Currently    Drug use: Not Currently     Types: Marijuana, Methamphetamines, Cocaine     Comment: past- opioid- clean for 7 years     Review of Systems   Constitutional:  Positive for fatigue and fever.   HENT:  Positive for sore throat. Negative for trouble swallowing and voice change.    Eyes:  Negative for visual disturbance.   Respiratory:  Negative for cough and shortness of breath.    Cardiovascular:  Negative for  chest pain.   Gastrointestinal:  Positive for abdominal pain, nausea and vomiting. Negative for diarrhea.   Genitourinary:  Negative for dysuria and hematuria.   Musculoskeletal:  Positive for myalgias. Negative for gait problem.        No Pain.   Skin:  Negative for color change and rash.   Neurological:  Positive for dizziness, weakness and light-headedness. Negative for headaches.   Psychiatric/Behavioral:  Negative for behavioral problems and sleep disturbance.    All other systems reviewed and are negative.    Physical Exam     Initial Vitals [03/29/23 0723]   BP Pulse Resp Temp SpO2   (!) 153/88 73 20 98.1 °F (36.7 °C) 99 %      MAP       --         Physical Exam    Nursing note and vitals reviewed.  Constitutional: She appears well-developed and well-nourished. No distress.   HENT:   Head: Normocephalic and atraumatic.   Neck:   Normal range of motion.  Cardiovascular:  Normal rate, regular rhythm and normal heart sounds.           Pulmonary/Chest: Breath sounds normal. No respiratory distress.   Abdominal: Abdomen is soft. Bowel sounds are normal. She exhibits no distension and no mass. There is no abdominal tenderness. There is no rebound and no guarding.   Musculoskeletal:         General: No tenderness or edema. Normal range of motion.      Cervical back: Normal range of motion.     Neurological: She is alert and oriented to person, place, and time.   Skin: Skin is warm and dry.   Psychiatric:   Anxious, sort of tearful, jittery,       ED Course   Procedures  Labs Reviewed   URINALYSIS, REFLEX TO URINE CULTURE - Abnormal; Notable for the following components:       Result Value    Ketones, UA Trace (*)     Blood, UA Trace-Intact (*)     All other components within normal limits   URINALYSIS, MICROSCOPIC - Abnormal; Notable for the following components:    Squamous Epithelial Cells, UA Few (*)     All other components within normal limits   COMPREHENSIVE METABOLIC PANEL - Abnormal; Notable for the  following components:    Glucose Level 115 (*)     Blood Urea Nitrogen 6.0 (*)     Globulin 4.2 (*)     Albumin/Globulin Ratio 1.0 (*)     All other components within normal limits   CBC WITH DIFFERENTIAL - Abnormal; Notable for the following components:    RBC 4.10 (*)     Hgb 11.9 (*)     Hct 36.1 (*)     IG# 0.08 (*)     All other components within normal limits   HCG QUALITATIVE URINE - Normal   CBC W/ AUTO DIFFERENTIAL    Narrative:     The following orders were created for panel order CBC auto differential.  Procedure                               Abnormality         Status                     ---------                               -----------         ------                     CBC with Differential[[id number]]        Abnormal            Final result                 Please view results for these tests on the individual orders.          Imaging Results              X-Ray Chest PA And Lateral (Preliminary result)  Result time 03/29/23 09:15:23      Wet Read by Tres Booker MD (03/29/23 09:15:23, [facility] - Emergency Dept, Emergency Medicine)    X-ray chest Two view:  Independently reviewed and/or interpreted by Tres Booker MD  No focal consolidation, no acute cardiothoracic abnormality identified                                     Medications   prochlorperazine injection Soln 10 mg (10 mg Intramuscular Given 3/29/23 0812)     Medical Decision Making:   History:   Old Medical Records: I decided to obtain old medical records.  Initial Assessment:     37 y.o. female,  has a past medical history of Anxiety disorder, unspecified, Asthma, Depression, Hypercholesteremia, and Opioid withdrawal. Presenting with Vomiting (Reports here twice yesterday for anxious feeling in her stomach and vomiting. 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, is not taking any medicine for  anxiety actually she has stopped all the medicines except for her MDI for asthma as she needs it, and she is still taking Suboxone and omeprazole, comes to the emergency room with complaint of nobody is doing anything about her problems, according to patient statement she was completely all right just went to see her family doctor yesterday because she was having some sore throat, he told her that she has a red throat, and he did testing on her and then told her that she has COVID-19, she was given 4 medicines including Paxlovid, levofloxacin, naproxen, and Zofran, she said that she took those medicines 1 dose each and 3 hours later she started having nausea, restlessness, stomach upset, she feels she is having colitis, which she had in the past, she can not sleep, restless, she said she came twice to the emergency room yesterday but they did not do anything about it, and they sent her home just like that she could not sleep all night, and this morning she is still having nausea and upset stomach and all the symptoms so she decided to come back to the emergency room.    She feels that she is going to die and nobody is worried about it, she has not taken her COVID-19 vaccines, she never had COVID-19 till yesterday.    Patient does not have any distress on examination, has almost perfectly normal exam except that she is jittery, restless, anxious, holding the vomiting back in the hand thing that she is going to throw up,    Essentially having anxiety that she is diagnosed with COVID-19 and nobody is worried about it and not doing anything about it, nobody is doing any blood work, no bruits checking her if she has colitis which she had in the past,    Yesterday the 1st time when she came to the emergency room while waiting in the waiting room she started feeling better so she left and then she came back with the same symptoms and she was reassured and sent home and she comes back again this morning with the same  symptoms.    I reassured her that her vital signs are normal, and she does not have any major abnormality on examination, her oxygen saturation is 99% on room air, and as long as she does not have a pneumonia with COVID-19 and her oxygen level is normal there is really not much needs to be done about it, but she is crying saying that she is going to die and I am telling her the same thing again like to doctors yesterday told her there is nothing wrong.    I advised her that I am going to do some blood work on her, will do a chest x-ray and I will give her some medicine to help her with the nausea and vomiting and then come back and talk to her.  But I have reassured her that her vital signs are perfectly normal and she really does not need any treatment as such.    She had about 15 medicine in her records which she was supposed to be taking for her anxiety, bipolar, schizophrenia, acid reflux but she says she is not taking any thing except for Suboxone, asthma pump and Prilosec.  I went over the list individually 1 x 1 and she told me that she is stopped all of the medicines.  And she says she is been clean for 7 years from her drugs and she is just taking Suboxone.      Clinical Tests:   Lab Tests: Reviewed and Ordered  Radiological Study: Reviewed and Ordered           ED Course as of 03/29/23 0934   Wed Mar 29, 2023   0926 Patient's workup in the emergency room does not reveal any major abnormality, she has minimal anemia, has a perfectly normal chest x-ray, O2 sat is still 99% on room air, I will go ahead and discharge her with instruction to take the medicine as prescribed by her doctor and if she keeps getting these symptoms after taking the medication then she can probably stop the medicine as she is having too much anxiety from taking does medication, and she can go and see her family doctor for follow-up. [GQ]      ED Course User Index  [GQ] Tres Booker MD                 Clinical Impression:   Final  diagnoses:  [U07.1] COVID-19 virus infection  [F41.9] Anxiety (Primary)        ED Disposition Condition    Discharge Stable          ED Prescriptions    None       Follow-up Information       Follow up With Specialties Details Why Contact Info    Diya Sommers MD Family Medicine In 2 days  [address]  [place] [address]  [phone]               Tres Booker MD  03/29/23 0935

## 2023-03-29 NOTE — ED PROVIDER NOTES
"Encounter Date: 3/29/2023       History     Chief Complaint   Patient presents with    Anxiety     Pt states that she tested positive for covid around 4pm yesterday. States that she "feels very nauseous, like she cant calm down". Brought in earlier by EMS but left because she thought she felt better. States that she has a history of anxiety and that this may be anxiety.      Ms. Trena Acosta is a 36 yo female PMHx Asthma, HTN, Anxiety, Depression, who presents with chief complaint anxiety. Onset was today when she began feeling very anxious stating that she is restless and having trouble sleeping. She reports that for the past 4 days she had been having sore throat and body aches so she went to her PCP yesterday and he tested her for strep and covid and she was found to be positive for covid. She states that he prescribed her a medication for covid that she takes 3 pills twice a day for 5 days, and after taking this medication is when her symptoms started. She reports that she used to have trouble with anxiety in the past and was started on Effexor but she stopped taking this medication months ago. When asked whether she was having any chest discomfort, she replies "A little bit, like when you are anxious."    The history is provided by the patient.   Review of patient's allergies indicates:   Allergen Reactions    Codeine Itching and Swelling     Past Medical History:   Diagnosis Date    Asthma     Hypertension     Opioid withdrawal      Past Surgical History:   Procedure Laterality Date     SECTION       History reviewed. No pertinent family history.  Social History     Tobacco Use    Smoking status: Never    Smokeless tobacco: Never   Substance Use Topics    Alcohol use: Not Currently     Review of Systems    Physical Exam     Initial Vitals [23 0304]   BP Pulse Resp Temp SpO2   (!) 163/88 70 18 97.6 °F (36.4 °C) 100 %      MAP       --         Physical Exam    Nursing note and vitals " "reviewed.  Constitutional: Vital signs are normal. She appears well-developed and well-nourished.   HENT:   Head: Normocephalic and atraumatic.   Mouth/Throat: Uvula is midline and mucous membranes are normal.   Eyes: EOM are normal. Pupils are equal, round, and reactive to light.   Neck: Trachea normal. Neck supple.   Normal range of motion.  Cardiovascular:  Normal rate, regular rhythm, intact distal pulses and normal pulses.           Pulmonary/Chest: Effort normal and breath sounds normal.   Abdominal: Abdomen is soft. Bowel sounds are normal. There is no abdominal tenderness. There is no rebound and no guarding.   Musculoskeletal:         General: Normal range of motion.      Cervical back: Normal range of motion and neck supple.     Neurological: She is alert and oriented to person, place, and time. GCS score is 15. GCS eye subscore is 4. GCS verbal subscore is 5. GCS motor subscore is 6.   Skin: Skin is warm and dry. Capillary refill takes less than 2 seconds.   Psychiatric: Her speech is normal and behavior is normal. Thought content normal. Her mood appears anxious. Cognition and memory are normal.       ED Course   Procedures  Labs Reviewed - No data to display  EKG Readings: (Independently Interpreted)   Initial Reading: No STEMI. Rhythm: Normal Sinus Rhythm. Heart Rate: 75. Conduction: Normal. Axis: Normal.     Imaging Results    None          Medications - No data to display  Medical Decision Making:   Initial Assessment:   Well appearing 38 yo female presents with feeling anxious since being diagnosed with covid yesterday and started on what sounds like paxlovid. She reports history of anxiety but states that she stopped taking her anxiety medicine months ago. I discussed getting an ECG since she is feeling anxious and reported having some chest discomfort that she describes as "feeling anxious." I do not suspect PE, ACS, pneumonia, pneumothorax, or electrolyte derangement. Her ECG shows normal sinus " rhythm. I do not feel that she requires any labs or imaging at this time. I discussed considering stopping the paxlovid as it sounds like her symptoms from covid are mild, and based on her reporting that she does not even take medicine for her asthma then I would not consider her to be high risk due to covid. I also discussed starting back on her anxiety medication to see if this helps. Instructed to follow up with her PCP and given strict ED return precautions. I have spoken with the patient and/or caregivers. I have explained the patient's condition, diagnoses and treatment plan based on the information available to me at this time. I have answered the patient's and/or caregiver's questions and addressed any concerns. The patient and/or caregivers have as good an understanding of the patient's diagnosis, condition and treatment plan as can be expected at this point. The vital signs have been stable. The patient's condition is stable and appropriate for discharge from the emergency department.    The patient will pursue further outpatient evaluation with the primary care physician or other designated or consulting physician as outlined in the discharge instructions. The patient and/or caregivers are agreeable to this plan of care and follow-up instructions have been explained in detail. The patient and/or caregivers have received these instructions in written format and have expressed an understanding of the discharge instructions. The patient and/or caregivers are aware that any significant change in condition or worsening of symptoms should prompt an immediate return to this or the closest emergency department or a call to 911.   Clinical Tests:   Medical Tests: Ordered and Reviewed  This patient presents with chest pain that is very unlikely angina or acute coronary syndrome. The emergency department evaluation has not identified any cause for suspicion that this chest pain has a cardiac etiology. Based on their  history, EKG (which showed no evidence of infarction), in addition to the patient's physical exam, I see no evidence at this time for a malignant etiology for the patient's chest pain. There is no acute evidence for pulmonary embolus, acute myocardial infarction, pneumothorax, Boerhaeve syndrome, cardiac tamponade, thoracic artery dissection, or any other emergent cardiac, pulmonary or aortic pathology. Given the low pre-test probability for cardiac etiology of chest pain and the absence of any sign of infarction, discharge for outpatient follow-up and further evaluation is reasonable.    Wells Criteria for Pulmonary Embolism    Clinical signs and symptoms of DVT: No (0 points)  PE is #1 diagnosis, or equally likely: No (0 points)  Heart rate over 100: No (0 points)  Immobilization at least 3 days, or surgery in past 4 weeks: No (0 points)  Previous, objectively diagnosed PE or DVT: No (0 points)  Hemoptysis: No (0 points)  Malignancy w/ treatment within 6 mo, or palliative: No (0 points)    0 Points. Low risk group: 1.3% chance PE (*Wells PS).        PERC Rule for Pulmonary Embolism    Age < 50: Yes  Heart rate < 100: Yes  O2 sat on room air > 94%: Yes  No history of DVT or PE: Yes  No recent trauma or surgery: Yes  No hemoptysis: Yes  No exogenous estrogen: Yes  No clinical signs suggesting DVT: Yes    Less than 2% risk of PE in this patient, if clinician's pre-test probability is 15% or less.                        Clinical Impression:   Final diagnoses:  [F41.9] Anxiety (Primary)  [U07.1] COVID-19 virus infection        ED Disposition Condition    Discharge Stable          ED Prescriptions    None       Follow-up Information       Follow up With Specialties Details Why Contact Info    Diya Sommers MD Family Medicine   [address] [place] [address]  [phone]               Aeljandro Landis,   03/29/23 3851

## 2023-06-20 ENCOUNTER — HOSPITAL ENCOUNTER (EMERGENCY)
Facility: HOSPITAL | Age: 38
Discharge: HOME | End: 2023-06-20
Attending: INTERNAL MEDICINE
Payer: MEDICAID

## 2023-06-20 VITALS
HEIGHT: 57 IN | WEIGHT: 170 LBS | DIASTOLIC BLOOD PRESSURE: 63 MMHG | HEART RATE: 68 BPM | RESPIRATION RATE: 13 BRPM | SYSTOLIC BLOOD PRESSURE: 142 MMHG | TEMPERATURE: 98 F | BODY MASS INDEX: 36.68 KG/M2 | OXYGEN SATURATION: 98 %

## 2023-06-20 DIAGNOSIS — R42 DIZZINESS: ICD-10-CM

## 2023-06-20 DIAGNOSIS — F41.9 ANXIETY: Primary | ICD-10-CM

## 2023-06-20 PROBLEM — E66.9 OBESITY: Status: ACTIVE | Noted: 2023-06-20

## 2023-06-20 LAB
ALBUMIN SERPL-MCNC: 3.8 G/DL (ref 3.5–5)
ALBUMIN/GLOB SERPL: 1.1 RATIO (ref 1.1–2)
ALP SERPL-CCNC: 50 UNIT/L (ref 40–150)
ALT SERPL-CCNC: 18 UNIT/L (ref 0–55)
AST SERPL-CCNC: 22 UNIT/L (ref 5–34)
BASOPHILS # BLD AUTO: 0.03 X10(3)/MCL
BASOPHILS NFR BLD AUTO: 0.4 %
BILIRUBIN DIRECT+TOT PNL SERPL-MCNC: 0.2 MG/DL
BNP BLD-MCNC: <10 PG/ML
BUN SERPL-MCNC: 14 MG/DL (ref 7–18.7)
CALCIUM SERPL-MCNC: 9.5 MG/DL (ref 8.4–10.2)
CHLORIDE SERPL-SCNC: 104 MMOL/L (ref 98–107)
CO2 SERPL-SCNC: 26 MMOL/L (ref 22–29)
CREAT SERPL-MCNC: 0.74 MG/DL (ref 0.55–1.02)
EOSINOPHIL # BLD AUTO: 0.22 X10(3)/MCL (ref 0–0.9)
EOSINOPHIL NFR BLD AUTO: 2.8 %
ERYTHROCYTE [DISTWIDTH] IN BLOOD BY AUTOMATED COUNT: 12.6 % (ref 11.5–17)
GFR SERPLBLD CREATININE-BSD FMLA CKD-EPI: >60 MLS/MIN/1.73/M2
GLOBULIN SER-MCNC: 3.6 GM/DL (ref 2.4–3.5)
GLUCOSE SERPL-MCNC: 99 MG/DL (ref 74–100)
HCT VFR BLD AUTO: 36.7 % (ref 37–47)
HGB BLD-MCNC: 11.8 G/DL (ref 12–16)
IMM GRANULOCYTES # BLD AUTO: 0.2 X10(3)/MCL (ref 0–0.04)
IMM GRANULOCYTES NFR BLD AUTO: 2.5 %
LYMPHOCYTES # BLD AUTO: 2.14 X10(3)/MCL (ref 0.6–4.6)
LYMPHOCYTES NFR BLD AUTO: 27.3 %
MCH RBC QN AUTO: 28.4 PG (ref 27–31)
MCHC RBC AUTO-ENTMCNC: 32.2 G/DL (ref 33–36)
MCV RBC AUTO: 88.4 FL (ref 80–94)
MONOCYTES # BLD AUTO: 0.49 X10(3)/MCL (ref 0.1–1.3)
MONOCYTES NFR BLD AUTO: 6.2 %
NEUTROPHILS # BLD AUTO: 4.77 X10(3)/MCL (ref 2.1–9.2)
NEUTROPHILS NFR BLD AUTO: 60.8 %
PLATELET # BLD AUTO: 268 X10(3)/MCL (ref 130–400)
PMV BLD AUTO: 8.9 FL (ref 7.4–10.4)
POTASSIUM SERPL-SCNC: 3.9 MMOL/L (ref 3.5–5.1)
PROT SERPL-MCNC: 7.4 GM/DL (ref 6.4–8.3)
RBC # BLD AUTO: 4.15 X10(6)/MCL (ref 4.2–5.4)
SODIUM SERPL-SCNC: 137 MMOL/L (ref 136–145)
TROPONIN I SERPL-MCNC: <0.01 NG/ML (ref 0–0.04)
WBC # SPEC AUTO: 7.85 X10(3)/MCL (ref 4.5–11.5)

## 2023-06-20 PROCEDURE — 85025 COMPLETE CBC W/AUTO DIFF WBC: CPT | Performed by: INTERNAL MEDICINE

## 2023-06-20 PROCEDURE — 93005 ELECTROCARDIOGRAM TRACING: CPT

## 2023-06-20 PROCEDURE — 93010 EKG 12-LEAD: ICD-10-PCS | Mod: ,,, | Performed by: INTERNAL MEDICINE

## 2023-06-20 PROCEDURE — 99285 EMERGENCY DEPT VISIT HI MDM: CPT | Mod: 25

## 2023-06-20 PROCEDURE — 93010 ELECTROCARDIOGRAM REPORT: CPT | Mod: ,,, | Performed by: INTERNAL MEDICINE

## 2023-06-20 PROCEDURE — 80053 COMPREHEN METABOLIC PANEL: CPT | Performed by: INTERNAL MEDICINE

## 2023-06-20 PROCEDURE — 84484 ASSAY OF TROPONIN QUANT: CPT | Performed by: INTERNAL MEDICINE

## 2023-06-20 PROCEDURE — 83880 ASSAY OF NATRIURETIC PEPTIDE: CPT | Performed by: INTERNAL MEDICINE

## 2023-06-20 PROCEDURE — 25000003 PHARM REV CODE 250: Performed by: INTERNAL MEDICINE

## 2023-06-20 RX ORDER — NAPROXEN SODIUM 220 MG/1
324 TABLET, FILM COATED ORAL
Status: COMPLETED | OUTPATIENT
Start: 2023-06-20 | End: 2023-06-20

## 2023-06-20 RX ORDER — TIZANIDINE 2 MG/1
2 TABLET ORAL 2 TIMES DAILY
COMMUNITY
Start: 2023-06-12

## 2023-06-20 RX ORDER — LISINOPRIL 10 MG/1
10 TABLET ORAL DAILY
COMMUNITY
Start: 2023-06-19

## 2023-06-20 RX ADMIN — ASPIRIN 81 MG 324 MG: 81 TABLET ORAL at 01:06

## 2023-06-20 NOTE — DISCHARGE INSTRUCTIONS
Take medicines as prescribed    See your family doctor in one to 2 days for further evaluation, workup, and treatment as necessary    Avoid driving or operating machinery while taking medicines as some medicines might cause drowsiness and may cause problems. Also pain medicines have potential of being addictive  so use Pain meds specially Narcotics Sparingly.    The exam and treatment you received in Emergency Room was for an urgent problem and NOT INTENDED AS COMPLETE CARE. It is important that you FOLLOW UP with a doctor for ongoing care. If your symptoms become WORSE or you DO NOT IMPROVE and you are unable to reach your health care provider, you should RETURN to the emergency department. The Emergency Room doctor has provided a PRELIMINARY INTERPRETATION of all your STUDIES. A final interpretation may be done after you are discharged. IF A CHANGE in your diagnosis or treatment is needed WE WILL CONTACT YOU. It is critical that we have a CURRENT PHONE NUMBER FOR YOU.        It is important that you see your family doctor in one to 2 days to reevaluate and to start you on blood pressure medication if needed    If you're already on blood pressure medication, you need to see your family doctor in one to 2 days to reevaluate and adjust the dose of medication as needed    You must take your blood pressure medicine regularly as uncontrolled blood pressure can cause major health conditions and problems like Stroke, Heart Attacks, Kidney Failure etc..

## 2023-06-20 NOTE — ED PROVIDER NOTES
06/20/2023         1:09 PM    Source of History:  History obtained from the patient.     Chief complaint:  From Nurse Triage:  Dizziness (States she was dx with HTN yesterday and is feeling dizzy and feels both arms are weak)    HISTORY OF PRESENT ILLNES:  Trena Acosta is a 38 y.o. female  has a past medical history of Anxiety disorder, unspecified, Asthma, Depression, Hypercholesteremia, and Opioid withdrawal. presenting with Dizziness (States she was dx with HTN yesterday and is feeling dizzy and feels both arms are weak)      REVIEW OF SYSTEMS:   Constitutional symptoms:  No Fever. No Chills    Skin symptoms:  No Rash.    Eye symptoms:  No Visual disturbance reported.   ENMT symptoms:  No Sore throat,    Respiratory symptoms:  No Shortness of Breath, no Cough, no Wheezing.    Cardiovascular symptoms:  No Chest Pain, No Palpitations.   Gastrointestinal symptoms:  No Abdominal Pain, No Nausea, No Vomiting, No Diarrhea, No Constipation.    Genitourinary symptoms:  No Dysuria,    Musculoskeletal symptoms:  No Back pain,    Neurologic symptoms:  No Headache, lightheadedness and Dizziness.    Psychiatric symptoms:  No Anxiety, No Depression, No Substance Abuse.              Additional review of systems information: Patient Denies Any Other Complaints.    All Other Systems Reviewed With Patient And Negative.    ALLEGIES:  Review of patient's allergies indicates:   Allergen Reactions    Codeine Itching and Swelling       MEDICINE LIST:  Current Outpatient Medications   Medication Instructions    FLOVENT HFA 44 mcg/actuation inhaler 2 puffs, 2 times daily    levoFLOXacin (LEVAQUIN) 500 mg, Oral, Daily    lisinopriL 10 mg, Oral, Daily    montelukast (SINGULAIR) 10 mg, Oral, Daily    naproxen (NAPROSYN) 500 mg, Oral, 2 times daily    omeprazole (PRILOSEC) 40 mg, Oral, Daily    SUBOXONE 8-2 mg 1 Film, Sublingual, 2 times daily    tiZANidine (ZANAFLEX) 2 mg, Oral, 2 times daily        PMH:  As per HPI and  below:    Reviewed and updated in chart.    PAST MEDICAL HISTORY:  Past Medical History:   Diagnosis Date    Anxiety disorder, unspecified     Asthma     Depression     Hypercholesteremia     Opioid withdrawal         PAST SURGICAL HISTORY:  Past Surgical History:   Procedure Laterality Date     SECTION         SOCIAL HISTORY:  Social History     Tobacco Use    Smoking status: Never    Smokeless tobacco: Never   Substance Use Topics    Alcohol use: Not Currently    Drug use: Not Currently     Types: Marijuana, Methamphetamines, Cocaine     Comment: past- opioid- clean for 7 years       FAMILY HISTORY:  Family History   Problem Relation Age of Onset    Depression Mother     COPD Mother     Hypertension Mother     Cancer Mother     Hypertension Father     Heart disease Father         PROBLEM LIST:  Patient Active Problem List   Diagnosis    Asthma    COVID-19 virus infection    Obesity        PHYSICAL EXAM:      ED Triage Vitals [23 1237]   BP (!) 167/98   Pulse 71   Resp 17   Temp 97.9 °F (36.6 °C)   SpO2 99 %        Vital Signs: Reviewed As In Chart.  General:  Alert, No Cardiorespiratory Distress Noted.   Eye:  Extraocular Movements Are Intact.   ENT: Mucus membranes are moist.   Cardiovascular:  Regular Rate And Rhythm, No Murmur, No Pedal Edema.    Respiratory:  Respirations Nonlabored, No Respiratory Distress, Good Bilateral Air Entry, No Rales, No Rhonchi.    Gastrointestinal:  Soft, Non Distended, Non Tenderness, Normal Bowel Sounds.    Neurological:  Alert And Oriented To Person, Place, Time, And Situation, Normal Motor Observed, Normal Speech Observed.  NIH 0  Musculoskeletal:  No Gross Deformity Noted.     Psychiatric:  Cooperative.  Anxious      ED WORKUP FOR MEDICAL DECISION MAKING:    ED ORDERS:  Orders Placed This Encounter   Procedures    X-ray Chest AP Portable    Comprehensive metabolic panel    CBC auto differential    Troponin I    Brain natriuretic peptide    CBC with Differential     Urinalysis, Reflex to Urine Culture    Pregnancy, urine rapid    Diet NPO    Vital signs    Cardiac Monitoring - Adult    Orthostatic vital signs    Oxygen Continuous    Pulse Oximetry Continuous    EKG 12-lead    Insert saline lock       ED MEDICINES:  Medications   aspirin chewable tablet 324 mg (324 mg Oral Given 6/20/23 1312)            ECG Results              EKG 12-lead (Preliminary result)  Result time 06/20/23 12:54:10      Wet Read by Tres Booker MD (06/20/23 12:54:10, [facility] - Emergency Dept, Emergency Medicine)    EKG Initial Reading: Independently Interpreted by Tres Booker MD. independently as: No STEMI. Rhythm: Normal Sinus Rhythm, Rate 69. Ectopy: No Ectopy. Conduction: Normal. ST Segments: Normal ST Segments. T Waves: Normal. Axis: Normal. Clinical Impression: Normal Sinus Rhythm with sinus arrhythmia                                    ED LABS ORDERED AND REVIEWED:  Admission on 06/20/2023   Component Date Value Ref Range Status    Sodium Level 06/20/2023 137  136 - 145 mmol/L Final    Potassium Level 06/20/2023 3.9  3.5 - 5.1 mmol/L Final    Chloride 06/20/2023 104  98 - 107 mmol/L Final    Carbon Dioxide 06/20/2023 26  22 - 29 mmol/L Final    Glucose Level 06/20/2023 99  74 - 100 mg/dL Final    Blood Urea Nitrogen 06/20/2023 14.0  7.0 - 18.7 mg/dL Final    Creatinine 06/20/2023 0.74  0.55 - 1.02 mg/dL Final    Calcium Level Total 06/20/2023 9.5  8.4 - 10.2 mg/dL Final    Protein Total 06/20/2023 7.4  6.4 - 8.3 gm/dL Final    Albumin Level 06/20/2023 3.8  3.5 - 5.0 g/dL Final    Globulin 06/20/2023 3.6 (H)  2.4 - 3.5 gm/dL Final    Albumin/Globulin Ratio 06/20/2023 1.1  1.1 - 2.0 ratio Final    Bilirubin Total 06/20/2023 0.2  <=1.5 mg/dL Final    Alkaline Phosphatase 06/20/2023 50  40 - 150 unit/L Final    Alanine Aminotransferase 06/20/2023 18  0 - 55 unit/L Final    Aspartate Aminotransferase 06/20/2023 22  5 - 34 unit/L Final    eGFR 06/20/2023 >60  mls/min/1.73/m2  Final    Troponin-I 06/20/2023 <0.010  0.000 - 0.045 ng/mL Final    Natriuretic Peptide 06/20/2023 <10.0  <=100.0 pg/mL Final    WBC 06/20/2023 7.85  4.50 - 11.50 x10(3)/mcL Final    RBC 06/20/2023 4.15 (L)  4.20 - 5.40 x10(6)/mcL Final    Hgb 06/20/2023 11.8 (L)  12.0 - 16.0 g/dL Final    Hct 06/20/2023 36.7 (L)  37.0 - 47.0 % Final    MCV 06/20/2023 88.4  80.0 - 94.0 fL Final    MCH 06/20/2023 28.4  27.0 - 31.0 pg Final    MCHC 06/20/2023 32.2 (L)  33.0 - 36.0 g/dL Final    RDW 06/20/2023 12.6  11.5 - 17.0 % Final    Platelet 06/20/2023 268  130 - 400 x10(3)/mcL Final    MPV 06/20/2023 8.9  7.4 - 10.4 fL Final    Neut % 06/20/2023 60.8  % Final    Lymph % 06/20/2023 27.3  % Final    Mono % 06/20/2023 6.2  % Final    Eos % 06/20/2023 2.8  % Final    Basophil % 06/20/2023 0.4  % Final    Lymph # 06/20/2023 2.14  0.6 - 4.6 x10(3)/mcL Final    Neut # 06/20/2023 4.77  2.1 - 9.2 x10(3)/mcL Final    Mono # 06/20/2023 0.49  0.1 - 1.3 x10(3)/mcL Final    Eos # 06/20/2023 0.22  0 - 0.9 x10(3)/mcL Final    Baso # 06/20/2023 0.03  <=0.2 x10(3)/mcL Final    IG# 06/20/2023 0.20 (H)  0 - 0.04 x10(3)/mcL Final    IG% 06/20/2023 2.5  % Final       RADIOLOGY STUDIES ORDERED AND REVIEWED:  Imaging Results              X-ray Chest AP Portable (Preliminary result)  Result time 06/20/23 14:28:14      Wet Read by Tres Booker MD (06/20/23 14:28:14, [facility] - Emergency Dept, Emergency Medicine)    Chest One View:  Independently reviewed and/or interpreted by Tres Booker MD.  No Focal Consolidation, No Acute Cardiopulmonary abnormality identified grossly.                                    MEDICAL DECISION MAKING:      Reviewed Nurses Note. Reviewed Vital Signs.     Reviewed Pertinent old records, History and updated as necessary.    Vitals:    06/20/23 1337   BP:    Pulse: 68   Resp: 13   Temp:         Medical Decision Making  38 y.o. female  has a past medical history of Anxiety disorder, unspecified, Asthma,  Depression, Hypercholesteremia, and Opioid withdrawal. presenting with Dizziness (States she was dx with HTN yesterday and is feeling dizzy and feels both arms are weak)    Patient was diagnosed with hypertension yesterday and was started on lisinopril she says her blood pressure keeps shooting up to 150 and then she started having lightheadedness dizziness and numbness on the body after taking lisinopril and feeling like she is going to pass out so she called her family doctor who told her to come to the emergency room right away.    Amount and/or Complexity of Data Reviewed  Labs: ordered. Decision-making details documented in ED Course.  Radiology: ordered. Decision-making details documented in ED Course.  ECG/medicine tests: ordered and independent interpretation performed. Decision-making details documented in ED Course.              ED Course as of 06/20/23 1429 Tue Jun 20, 2023 1426 Patient has not been able to give me the urine sample, but rest of the workup is essentially negative, I will go ahead and let her go home with instruction that she should give long enough time for the blood pressure medicine work and continue taking the medication and see her family doctor for follow-up. [GQ]      ED Course User Index  [GQ] Tres Booker MD            PROCEDURES PERFORMED IN ED:  Procedures    DIAGNOSTIC IMPRESSION:        ICD-10-CM ICD-9-CM   1. Anxiety  F41.9 300.00   2. Dizziness  R42 780.4         ED Disposition Condition    Discharge Stable               Medication List        ASK your doctor about these medications      FLOVENT HFA 44 mcg/actuation inhaler  Generic drug: fluticasone propionate     levoFLOXacin 500 MG tablet  Commonly known as: LEVAQUIN     lisinopriL 10 MG tablet     montelukast 10 mg tablet  Commonly known as: SINGULAIR     naproxen 500 MG tablet  Commonly known as: NAPROSYN     omeprazole 40 MG capsule  Commonly known as: PRILOSEC     SUBOXONE 8-2 mg  Generic drug:  buprenorphine-naloxone 8-2 mg     tiZANidine 2 MG tablet  Commonly known as: ZANAFLEX                Follow-up Information       PMD In 2 days.                              ED Prescriptions    None       Follow-up Information       Follow up With Specialties Details Why Contact Info    PMD  In 2 days                 Tres Booker MD  06/20/23 1494

## 2023-07-15 ENCOUNTER — HOSPITAL ENCOUNTER (EMERGENCY)
Facility: HOSPITAL | Age: 38
Discharge: HOME | End: 2023-07-15
Attending: INTERNAL MEDICINE
Payer: MEDICAID

## 2023-07-15 VITALS
SYSTOLIC BLOOD PRESSURE: 136 MMHG | DIASTOLIC BLOOD PRESSURE: 85 MMHG | HEART RATE: 67 BPM | HEIGHT: 56 IN | RESPIRATION RATE: 16 BRPM | OXYGEN SATURATION: 97 % | BODY MASS INDEX: 37.79 KG/M2 | TEMPERATURE: 98 F | WEIGHT: 168 LBS

## 2023-07-15 DIAGNOSIS — R10.84 GENERALIZED ABDOMINAL PAIN: ICD-10-CM

## 2023-07-15 DIAGNOSIS — M54.9 ACUTE BILATERAL BACK PAIN, UNSPECIFIED BACK LOCATION: Primary | ICD-10-CM

## 2023-07-15 LAB
ALBUMIN SERPL-MCNC: 4.2 G/DL (ref 3.5–5)
ALBUMIN/GLOB SERPL: 1 RATIO (ref 1.1–2)
ALP SERPL-CCNC: 50 UNIT/L (ref 40–150)
ALT SERPL-CCNC: 14 UNIT/L (ref 0–55)
APPEARANCE UR: CLEAR
AST SERPL-CCNC: 19 UNIT/L (ref 5–34)
B-HCG SERPL QL: NEGATIVE
BACTERIA #/AREA URNS AUTO: ABNORMAL /HPF
BASOPHILS # BLD AUTO: 0.04 X10(3)/MCL
BASOPHILS NFR BLD AUTO: 0.3 %
BILIRUB UR QL STRIP.AUTO: NEGATIVE
BILIRUBIN DIRECT+TOT PNL SERPL-MCNC: 0.2 MG/DL
BUN SERPL-MCNC: 11 MG/DL (ref 7–18.7)
CALCIUM SERPL-MCNC: 9.8 MG/DL (ref 8.4–10.2)
CHLORIDE SERPL-SCNC: 103 MMOL/L (ref 98–107)
CO2 SERPL-SCNC: 26 MMOL/L (ref 22–29)
COLOR UR: YELLOW
CREAT SERPL-MCNC: 0.76 MG/DL (ref 0.55–1.02)
EOSINOPHIL # BLD AUTO: 0.4 X10(3)/MCL (ref 0–0.9)
EOSINOPHIL NFR BLD AUTO: 3.4 %
ERYTHROCYTE [DISTWIDTH] IN BLOOD BY AUTOMATED COUNT: 12.6 % (ref 11.5–17)
GFR SERPLBLD CREATININE-BSD FMLA CKD-EPI: >60 MLS/MIN/1.73/M2
GLOBULIN SER-MCNC: 4.1 GM/DL (ref 2.4–3.5)
GLUCOSE SERPL-MCNC: 107 MG/DL (ref 74–100)
GLUCOSE UR QL STRIP.AUTO: NEGATIVE
HCT VFR BLD AUTO: 38.1 % (ref 37–47)
HGB BLD-MCNC: 12.5 G/DL (ref 12–16)
IMM GRANULOCYTES # BLD AUTO: 0.17 X10(3)/MCL (ref 0–0.04)
IMM GRANULOCYTES NFR BLD AUTO: 1.5 %
KETONES UR QL STRIP.AUTO: NEGATIVE
LEUKOCYTE ESTERASE UR QL STRIP.AUTO: NEGATIVE
LIPASE SERPL-CCNC: 23 U/L
LYMPHOCYTES # BLD AUTO: 2.37 X10(3)/MCL (ref 0.6–4.6)
LYMPHOCYTES NFR BLD AUTO: 20.3 %
MCH RBC QN AUTO: 28.9 PG (ref 27–31)
MCHC RBC AUTO-ENTMCNC: 32.8 G/DL (ref 33–36)
MCV RBC AUTO: 88 FL (ref 80–94)
MONOCYTES # BLD AUTO: 0.73 X10(3)/MCL (ref 0.1–1.3)
MONOCYTES NFR BLD AUTO: 6.3 %
NEUTROPHILS # BLD AUTO: 7.94 X10(3)/MCL (ref 2.1–9.2)
NEUTROPHILS NFR BLD AUTO: 68.2 %
NITRITE UR QL STRIP.AUTO: NEGATIVE
PH UR STRIP.AUTO: 6 [PH]
PLATELET # BLD AUTO: 311 X10(3)/MCL (ref 130–400)
PMV BLD AUTO: 8.9 FL (ref 7.4–10.4)
POTASSIUM SERPL-SCNC: 3.6 MMOL/L (ref 3.5–5.1)
PROT SERPL-MCNC: 8.3 GM/DL (ref 6.4–8.3)
PROT UR QL STRIP.AUTO: NEGATIVE
RBC # BLD AUTO: 4.33 X10(6)/MCL (ref 4.2–5.4)
RBC #/AREA URNS AUTO: ABNORMAL /HPF
RBC UR QL AUTO: ABNORMAL
SODIUM SERPL-SCNC: 138 MMOL/L (ref 136–145)
SP GR UR STRIP.AUTO: 1.02
SQUAMOUS #/AREA URNS AUTO: ABNORMAL /HPF
UROBILINOGEN UR STRIP-ACNC: 0.2
WBC # SPEC AUTO: 11.65 X10(3)/MCL (ref 4.5–11.5)
WBC #/AREA URNS AUTO: ABNORMAL /HPF

## 2023-07-15 PROCEDURE — 85025 COMPLETE CBC W/AUTO DIFF WBC: CPT | Performed by: NURSE PRACTITIONER

## 2023-07-15 PROCEDURE — 83690 ASSAY OF LIPASE: CPT | Performed by: NURSE PRACTITIONER

## 2023-07-15 PROCEDURE — 63600175 PHARM REV CODE 636 W HCPCS: Performed by: NURSE PRACTITIONER

## 2023-07-15 PROCEDURE — 96374 THER/PROPH/DIAG INJ IV PUSH: CPT

## 2023-07-15 PROCEDURE — 81025 URINE PREGNANCY TEST: CPT | Performed by: NURSE PRACTITIONER

## 2023-07-15 PROCEDURE — 81001 URINALYSIS AUTO W/SCOPE: CPT | Performed by: NURSE PRACTITIONER

## 2023-07-15 PROCEDURE — 99285 EMERGENCY DEPT VISIT HI MDM: CPT | Mod: 25

## 2023-07-15 PROCEDURE — 80053 COMPREHEN METABOLIC PANEL: CPT | Performed by: NURSE PRACTITIONER

## 2023-07-15 RX ORDER — NAPROXEN 500 MG/1
500 TABLET ORAL 2 TIMES DAILY
Qty: 20 TABLET | Refills: 0 | Status: SHIPPED | OUTPATIENT
Start: 2023-07-15 | End: 2023-07-25

## 2023-07-15 RX ORDER — KETOROLAC TROMETHAMINE 30 MG/ML
30 INJECTION, SOLUTION INTRAMUSCULAR; INTRAVENOUS
Status: COMPLETED | OUTPATIENT
Start: 2023-07-15 | End: 2023-07-15

## 2023-07-15 RX ADMIN — KETOROLAC TROMETHAMINE 30 MG: 30 INJECTION, SOLUTION INTRAMUSCULAR; INTRAVENOUS at 06:07

## 2023-07-15 NOTE — ED PROVIDER NOTES
Encounter Date: 7/15/2023       History     Chief Complaint   Patient presents with    Abdominal Pain     C/o generalized abdominal pain radiating to back x3 days. States she did have diarrhea x2-3 days ago.      See MDM    The history is provided by the patient. No  was used.   Review of patient's allergies indicates:   Allergen Reactions    Codeine Itching and Swelling     Past Medical History:   Diagnosis Date    Anxiety disorder, unspecified     Asthma     Depression     Hypercholesteremia     Hypertension     Opioid withdrawal      Past Surgical History:   Procedure Laterality Date     SECTION       Family History   Problem Relation Age of Onset    Depression Mother     COPD Mother     Hypertension Mother     Cancer Mother     Hypertension Father     Heart disease Father      Social History     Tobacco Use    Smoking status: Never    Smokeless tobacco: Never   Substance Use Topics    Alcohol use: Not Currently    Drug use: Not Currently     Types: Marijuana, Methamphetamines, Cocaine     Comment: past- opioid- clean for 7 years     Review of Systems   Gastrointestinal:  Positive for abdominal pain and nausea.   Musculoskeletal:  Positive for back pain.   All other systems reviewed and are negative.    Physical Exam     Initial Vitals [07/15/23 1547]   BP Pulse Resp Temp SpO2   (!) 166/83 80 16 98.1 °F (36.7 °C) 100 %      MAP       --         Physical Exam    Nursing note and vitals reviewed.  Constitutional: She appears well-developed and well-nourished.   Cardiovascular:  Normal rate, regular rhythm and normal heart sounds.           Pulmonary/Chest: Breath sounds normal. No respiratory distress.   Abdominal: Abdomen is soft. Bowel sounds are normal. She exhibits no distension. There is abdominal tenderness (mildly, diffuse).   Musculoskeletal:         General: Normal range of motion.      Thoracic back: Tenderness present. No bony tenderness.      Lumbar back: Tenderness present.  No bony tenderness.     Neurological: She is alert and oriented to person, place, and time.   Skin: Skin is warm and dry.   Psychiatric: She has a normal mood and affect.       ED Course   Procedures  Labs Reviewed   CBC WITH DIFFERENTIAL - Abnormal; Notable for the following components:       Result Value    WBC 11.65 (*)     MCHC 32.8 (*)     IG# 0.17 (*)     All other components within normal limits   COMPREHENSIVE METABOLIC PANEL - Abnormal; Notable for the following components:    Glucose Level 107 (*)     Globulin 4.1 (*)     Albumin/Globulin Ratio 1.0 (*)     All other components within normal limits   URINALYSIS, REFLEX TO URINE CULTURE - Abnormal; Notable for the following components:    Blood, UA Trace-Intact (*)     All other components within normal limits   URINALYSIS, MICROSCOPIC - Abnormal; Notable for the following components:    Squamous Epithelial Cells, UA Few (*)     All other components within normal limits   LIPASE - Normal   PREGNANCY TEST, URINE RAPID - Normal          Imaging Results              CT Abdomen Pelvis  Without Contrast (Final result)  Result time 07/15/23 17:19:59   Procedure changed from CT Abdomen Pelvis With Contrast     Final result by Chencho Hidalgo MD (07/15/23 17:19:59)                   Impression:      No acute abdominopelvic findings on this noncontrast scan.      Electronically signed by: Chencho Hidalgo  Date:    07/15/2023  Time:    17:19               Narrative:    EXAMINATION:  CT ABDOMEN PELVIS WITHOUT CONTRAST    CLINICAL HISTORY:  Abdominal pain, acute, nonlocalized;    TECHNIQUE:  Helical acquisition through the abdomen and pelvis without IV contrast.  Lack of contrast limits evaluation of solid organs and vascular structures .  Three plane reconstructions were provided for review.  mGycm. Automatic exposure control, adjustment of mA/kV or iterative reconstruction technique was used to reduce radiation.    COMPARISON:  13 September 2022    FINDINGS:  The  limited imaged lung bases are clear.    There is no significant abnormality of the noncontrast liver, gallbladder, spleen, pancreas or adrenals.    There is no hydronephrosis.  No urinary tract calculi are seen.    No bowel obstruction. No significant inflammatory changes of the bowel.  Normal appendix.  No free air.    Urinary bladder unremarkable.  No pelvic free fluid.  A 2-3 cm left adnexal cyst could be within normal limits for age.  The abdominal aorta is normal in caliber.    No acute osseous findings.  Small fat containing paraumbilical hernia.                                       Medications   iopamidoL (ISOVUE-370) injection 100 mL (has no administration in time range)   ketorolac injection 30 mg (has no administration in time range)     Medical Decision Making:   Differential Diagnosis:   Includes but not limited to constipation, UTI, back strain  Clinical Tests:   Lab Tests: Ordered and Reviewed  The following lab test(s) were unremarkable: CBC, CMP and Urinalysis  Radiological Study: Ordered and Reviewed  ED Management:  39 y/o female who presents with 3-4 day history of upper and lower back pain and abdominal pain. Nausea. No vomiting. No fever. No diarrhea. Last BM today, normal. No dysuria.     Labs unremarkable for acute findings. CT neg. Pain in back, tender to palpation. Abdomen is mild compared to back. Will treat as musculoskeletal. She is on zanaflex already as well.                         Clinical Impression:   Final diagnoses:  [M54.9] Acute bilateral back pain, unspecified back location (Primary)  [R10.84] Generalized abdominal pain        ED Disposition Condition    Discharge Stable          ED Prescriptions       Medication Sig Dispense Start Date End Date Auth. Provider    naproxen (NAPROSYN) 500 MG tablet Take 1 tablet (500 mg total) by mouth 2 (two) times daily. for 10 days 20 tablet 7/15/2023 7/25/2023 PRASHANTH Muñoz          Follow-up Information       Follow up With  Specialties Details Why Contact Info    Diya Sommers MD Family Medicine  keep scheduled appointment monday [address] [place] [address]  [phone]               Charisma Lara, [facility]  07/15/23 3036

## 2023-07-15 NOTE — FIRST PROVIDER EVALUATION
"Medical screening examination initiated.  I have conducted a focused provider triage encounter, findings are as follows:    Brief history of present illness:  37 y/o female presents with abdominal pain and back pain for 3-4 days now. Nausea. No vomiting. No diarrhea or constipation. Normal BM today. Lmp end of last month. No fever.     Vitals:    07/15/23 1547   BP: (!) 166/83   Pulse: 80   Resp: 16   Temp: 98.1 °F (36.7 °C)   TempSrc: Oral   SpO2: 100%   Weight: 76.2 kg (168 lb)   Height: 4' 8" (1.422 m)       Pertinent physical exam:  alert, nonlabored, ambulatory     Brief workup plan:  labs, urine    Preliminary workup initiated; this workup will be continued and followed by the physician or advanced practice provider that is assigned to the patient when roomed.  "

## 2023-08-16 ENCOUNTER — HOSPITAL ENCOUNTER (OUTPATIENT)
Dept: RADIOLOGY | Facility: HOSPITAL | Age: 38
Discharge: HOME | End: 2023-08-16
Attending: NURSE PRACTITIONER
Payer: MEDICAID

## 2023-08-16 DIAGNOSIS — M54.12 CERVICAL RADICULOPATHY: ICD-10-CM

## 2023-08-16 PROCEDURE — 72040 X-RAY EXAM NECK SPINE 2-3 VW: CPT | Mod: TC

## 2023-09-11 ENCOUNTER — HOSPITAL ENCOUNTER (EMERGENCY)
Facility: HOSPITAL | Age: 38
Discharge: HOME | End: 2023-09-11
Attending: INTERNAL MEDICINE
Payer: MEDICAID

## 2023-09-11 VITALS
HEART RATE: 65 BPM | TEMPERATURE: 99 F | HEIGHT: 56 IN | WEIGHT: 160 LBS | SYSTOLIC BLOOD PRESSURE: 115 MMHG | DIASTOLIC BLOOD PRESSURE: 81 MMHG | OXYGEN SATURATION: 96 % | BODY MASS INDEX: 35.99 KG/M2 | RESPIRATION RATE: 15 BRPM

## 2023-09-11 DIAGNOSIS — R42 DIZZINESS: ICD-10-CM

## 2023-09-11 DIAGNOSIS — R51.9 NONINTRACTABLE HEADACHE, UNSPECIFIED CHRONICITY PATTERN, UNSPECIFIED HEADACHE TYPE: Primary | ICD-10-CM

## 2023-09-11 LAB
ALBUMIN SERPL-MCNC: 4.3 G/DL (ref 3.5–5)
ALBUMIN/GLOB SERPL: 1.2 RATIO (ref 1.1–2)
ALP SERPL-CCNC: 46 UNIT/L (ref 40–150)
ALT SERPL-CCNC: 17 UNIT/L (ref 0–55)
APPEARANCE UR: CLEAR
AST SERPL-CCNC: 23 UNIT/L (ref 5–34)
B-HCG SERPL QL: NEGATIVE
BACTERIA #/AREA URNS AUTO: ABNORMAL /HPF
BASOPHILS # BLD AUTO: 0.05 X10(3)/MCL
BASOPHILS NFR BLD AUTO: 0.6 %
BILIRUB SERPL-MCNC: 0.3 MG/DL
BILIRUB UR QL STRIP.AUTO: NEGATIVE
BUN SERPL-MCNC: 6 MG/DL (ref 7–18.7)
CALCIUM SERPL-MCNC: 9.6 MG/DL (ref 8.4–10.2)
CHLORIDE SERPL-SCNC: 102 MMOL/L (ref 98–107)
CO2 SERPL-SCNC: 27 MMOL/L (ref 22–29)
COLOR UR: ABNORMAL
CREAT SERPL-MCNC: 0.74 MG/DL (ref 0.55–1.02)
EOSINOPHIL # BLD AUTO: 0.54 X10(3)/MCL (ref 0–0.9)
EOSINOPHIL NFR BLD AUTO: 6.5 %
ERYTHROCYTE [DISTWIDTH] IN BLOOD BY AUTOMATED COUNT: 12.5 % (ref 11.5–17)
GFR SERPLBLD CREATININE-BSD FMLA CKD-EPI: >60 MLS/MIN/1.73/M2
GLOBULIN SER-MCNC: 3.7 GM/DL (ref 2.4–3.5)
GLUCOSE SERPL-MCNC: 85 MG/DL (ref 74–100)
GLUCOSE UR QL STRIP.AUTO: NEGATIVE
HCT VFR BLD AUTO: 39.5 % (ref 37–47)
HGB BLD-MCNC: 12.6 G/DL (ref 12–16)
IMM GRANULOCYTES # BLD AUTO: 0.1 X10(3)/MCL (ref 0–0.04)
IMM GRANULOCYTES NFR BLD AUTO: 1.2 %
KETONES UR QL STRIP.AUTO: NEGATIVE
LEUKOCYTE ESTERASE UR QL STRIP.AUTO: NEGATIVE
LYMPHOCYTES # BLD AUTO: 2.18 X10(3)/MCL (ref 0.6–4.6)
LYMPHOCYTES NFR BLD AUTO: 26.3 %
MCH RBC QN AUTO: 28.1 PG (ref 27–31)
MCHC RBC AUTO-ENTMCNC: 31.9 G/DL (ref 33–36)
MCV RBC AUTO: 88 FL (ref 80–94)
MONOCYTES # BLD AUTO: 0.45 X10(3)/MCL (ref 0.1–1.3)
MONOCYTES NFR BLD AUTO: 5.4 %
NEUTROPHILS # BLD AUTO: 4.97 X10(3)/MCL (ref 2.1–9.2)
NEUTROPHILS NFR BLD AUTO: 60 %
NITRITE UR QL STRIP.AUTO: NEGATIVE
PH UR STRIP.AUTO: 7 [PH]
PLATELET # BLD AUTO: 314 X10(3)/MCL (ref 130–400)
PMV BLD AUTO: 8.5 FL (ref 7.4–10.4)
POTASSIUM SERPL-SCNC: 3.7 MMOL/L (ref 3.5–5.1)
PROT SERPL-MCNC: 8 GM/DL (ref 6.4–8.3)
PROT UR QL STRIP.AUTO: NEGATIVE
RBC # BLD AUTO: 4.49 X10(6)/MCL (ref 4.2–5.4)
RBC #/AREA URNS AUTO: ABNORMAL /HPF
RBC UR QL AUTO: ABNORMAL
SODIUM SERPL-SCNC: 137 MMOL/L (ref 136–145)
SP GR UR STRIP.AUTO: <=1.005 (ref 1–1.03)
SQUAMOUS #/AREA URNS AUTO: ABNORMAL /HPF
TROPONIN I SERPL-MCNC: <0.01 NG/ML (ref 0–0.04)
UROBILINOGEN UR STRIP-ACNC: 0.2
WBC # SPEC AUTO: 8.29 X10(3)/MCL (ref 4.5–11.5)
WBC #/AREA URNS AUTO: ABNORMAL /HPF

## 2023-09-11 PROCEDURE — 93005 ELECTROCARDIOGRAM TRACING: CPT

## 2023-09-11 PROCEDURE — 85025 COMPLETE CBC W/AUTO DIFF WBC: CPT | Performed by: NURSE PRACTITIONER

## 2023-09-11 PROCEDURE — 80053 COMPREHEN METABOLIC PANEL: CPT | Performed by: NURSE PRACTITIONER

## 2023-09-11 PROCEDURE — 99285 EMERGENCY DEPT VISIT HI MDM: CPT | Mod: 25

## 2023-09-11 PROCEDURE — 84484 ASSAY OF TROPONIN QUANT: CPT | Performed by: NURSE PRACTITIONER

## 2023-09-11 PROCEDURE — 93010 EKG 12-LEAD: ICD-10-PCS | Mod: ,,, | Performed by: STUDENT IN AN ORGANIZED HEALTH CARE EDUCATION/TRAINING PROGRAM

## 2023-09-11 PROCEDURE — 81001 URINALYSIS AUTO W/SCOPE: CPT | Performed by: NURSE PRACTITIONER

## 2023-09-11 PROCEDURE — 93010 ELECTROCARDIOGRAM REPORT: CPT | Mod: ,,, | Performed by: STUDENT IN AN ORGANIZED HEALTH CARE EDUCATION/TRAINING PROGRAM

## 2023-09-11 PROCEDURE — 81025 URINE PREGNANCY TEST: CPT | Performed by: NURSE PRACTITIONER

## 2023-09-11 NOTE — ED PROVIDER NOTES
Encounter Date: 2023       History     Chief Complaint   Patient presents with    Headache     Headache, dizziness started today,  pt states when she feels like this its because her blood pressure is high,  states b/p 150/100 before she called EMS.  Compliant with b/p meds     See MDM    The history is provided by the patient. No  was used.     Review of patient's allergies indicates:   Allergen Reactions    Codeine Itching and Swelling     Past Medical History:   Diagnosis Date    Anxiety disorder, unspecified     Asthma     Depression     Hypercholesteremia     Hypertension     Opioid withdrawal      Past Surgical History:   Procedure Laterality Date     SECTION       Family History   Problem Relation Age of Onset    Depression Mother     COPD Mother     Hypertension Mother     Cancer Mother     Hypertension Father     Heart disease Father      Social History     Tobacco Use    Smoking status: Never    Smokeless tobacco: Never   Substance Use Topics    Alcohol use: Not Currently    Drug use: Not Currently     Types: Marijuana, Methamphetamines, Cocaine     Comment: past- opioid- clean for 7 years     Review of Systems   Constitutional:  Negative for fever.   Respiratory:  Negative for cough and shortness of breath.    Cardiovascular:  Negative for chest pain.   Gastrointestinal:  Negative for abdominal pain.   Genitourinary:  Negative for difficulty urinating and dysuria.   Musculoskeletal:  Negative for gait problem.   Skin:  Negative for color change.   Neurological:  Positive for dizziness and headaches. Negative for speech difficulty.   Psychiatric/Behavioral:  Negative for hallucinations and suicidal ideas.    All other systems reviewed and are negative.      Physical Exam     Initial Vitals [23 1312]   BP Pulse Resp Temp SpO2   (!) 156/80 72 16 98.8 °F (37.1 °C) 100 %      MAP       --         Physical Exam    Nursing note and vitals reviewed.  Constitutional: She  appears well-developed and well-nourished.   HENT:   Head: Normocephalic.   Eyes: EOM are normal.   Neck:   Normal range of motion.  Cardiovascular:  Normal rate, regular rhythm, normal heart sounds and intact distal pulses.           Pulmonary/Chest: Breath sounds normal. No respiratory distress.   Abdominal: Abdomen is soft. Bowel sounds are normal. There is no abdominal tenderness.   Musculoskeletal:         General: Normal range of motion.      Cervical back: Normal range of motion.     Neurological: She is alert and oriented to person, place, and time. She has normal strength.   Skin: Skin is warm and dry.   Psychiatric: She has a normal mood and affect. Her behavior is normal. Judgment and thought content normal.         ED Course   Procedures  Labs Reviewed   COMPREHENSIVE METABOLIC PANEL - Abnormal; Notable for the following components:       Result Value    Blood Urea Nitrogen 6.0 (*)     Globulin 3.7 (*)     All other components within normal limits   URINALYSIS, REFLEX TO URINE CULTURE - Abnormal; Notable for the following components:    Color, UA Other (*)     Blood, UA Trace-Intact (*)     All other components within normal limits   CBC WITH DIFFERENTIAL - Abnormal; Notable for the following components:    MCHC 31.9 (*)     IG# 0.10 (*)     All other components within normal limits   URINALYSIS, MICROSCOPIC - Abnormal; Notable for the following components:    Bacteria, UA Few (*)     Squamous Epithelial Cells, UA Few (*)     All other components within normal limits   TROPONIN I - Normal   HCG QUALITATIVE URINE - Normal   CBC W/ AUTO DIFFERENTIAL    Narrative:     The following orders were created for panel order CBC auto differential.  Procedure                               Abnormality         Status                     ---------                               -----------         ------                     CBC with Differential[[id number]]       Abnormal            Final result                 Please  view results for these tests on the individual orders.     EKG Readings: (Independently Interpreted)   Rhythm: Normal Sinus Rhythm. Heart Rate: 67. Ectopy: No Ectopy. Conduction: Normal. ST Segments: Normal ST Segments. T Waves: Normal. Axis: Normal. Clinical Impression: Normal Sinus Rhythm       Imaging Results              CT Head Without Contrast (Final result)  Result time 09/11/23 14:52:15      Final result by Jamshid Kinney MD (09/11/23 14:52:15)                   Impression:      1. No acute intracranial abnormality identified      Electronically signed by: Jamshid Kinney  Date:    09/11/2023  Time:    14:52               Narrative:    EXAMINATION:  CT HEAD WITHOUT CONTRAST    CLINICAL HISTORY:  Headache, new or worsening, neuro deficit (Age 19-49y);, .    TECHNIQUE:  PATIENT RADIATION DOSE: DLP(mGycm) 876    As per PQRS measures, all CT scans at this facility used dose modulation, iterative reconstruction, and/or weight based dose adjustment when appropriate to reduce radiation dose to as low as reasonably achievable.    COMPARISON:  None available.    FINDINGS:  Serial axial images were obtained of the head without the administration of IV contrast. Both brain and bone parenchymal windows were obtained.  Additional coronal and sagittal reconstructions were obtained.  Ventricles, cisterns, and sulci are within normal limits.  There is no evidence of intracranial hemorrhage, midline shift, mass effect, or abnormal extra-axial fluid collections.  Visualized paranasal sinuses and mastoid air cells are relatively clear.  A few filling defects seen within the external auditory canals bilaterally suggesting cerumen.  Clinical correlation is indicated                                       X-Ray Chest PA And Lateral (Preliminary result)  Result time 09/11/23 15:03:53      Wet Read by Fish Paez FNP (09/11/23 15:03:53, [facility] Emergency Dept, Emergency Medicine)    No acute findings                                        Medications - No data to display  Medical Decision Making  Historian:  Patient.  Patient is a 38-year-old female  that presents with headache and dizziness that has been present today. Associated symptoms nothing. Surrounding information is nothing. Exacerbated by nothing. Relieved by nothing. Patient treatment prior to arrival none. Risk factors include none. Other history pertaining to this complaint nothing.   Assessment:  See physical exam.  DD:  Headache, intracranial hemorrhage, acute coronary syndrome, hypertensive crisis, electrolyte imbalance, dehydration    Amount and/or Complexity of Data Reviewed  Labs: ordered.     Details: Labs were unremarkable  Radiology: ordered.     Details: Chest x-ray and CT head were negative for acute findings  ECG/medicine tests:  Decision-making details documented in ED Course.  Discussion of management or test interpretation with external provider(s): History was obtained.  Physical was performed.  Patient states she feels much better than a blood pressure was down.  Instructed to follow up with her PCP for any blood pressure medication adjustments.  No medical or surgical consult were indicated in the ER.  No social determinants that affect healthcare noted.                               Clinical Impression:   Final diagnoses:  [R42] Dizziness  [R51.9] Nonintractable headache, unspecified chronicity pattern, unspecified headache type (Primary)        ED Disposition Condition    Discharge Stable          ED Prescriptions    None       Follow-up Information       Follow up With Specialties Details Why Contact Info    Your Primary Care Provider  Call in 3 days ed follow up              Fish Paez, PRASHANTH  09/11/23 5002

## 2023-10-16 ENCOUNTER — HOSPITAL ENCOUNTER (EMERGENCY)
Facility: HOSPITAL | Age: 38
Discharge: HOME | End: 2023-10-16
Attending: INTERNAL MEDICINE
Payer: MEDICAID

## 2023-10-16 VITALS
SYSTOLIC BLOOD PRESSURE: 133 MMHG | OXYGEN SATURATION: 99 % | DIASTOLIC BLOOD PRESSURE: 88 MMHG | HEIGHT: 56 IN | RESPIRATION RATE: 18 BRPM | TEMPERATURE: 98 F | WEIGHT: 160 LBS | HEART RATE: 68 BPM | BODY MASS INDEX: 35.99 KG/M2

## 2023-10-16 DIAGNOSIS — I10 HYPERTENSION, UNSPECIFIED TYPE: Primary | ICD-10-CM

## 2023-10-16 DIAGNOSIS — R07.9 CHEST PAIN: ICD-10-CM

## 2023-10-16 PROCEDURE — 93010 EKG 12-LEAD: ICD-10-PCS | Mod: ,,, | Performed by: STUDENT IN AN ORGANIZED HEALTH CARE EDUCATION/TRAINING PROGRAM

## 2023-10-16 PROCEDURE — 99284 EMERGENCY DEPT VISIT MOD MDM: CPT | Mod: 25

## 2023-10-16 PROCEDURE — 93010 ELECTROCARDIOGRAM REPORT: CPT | Mod: ,,, | Performed by: STUDENT IN AN ORGANIZED HEALTH CARE EDUCATION/TRAINING PROGRAM

## 2023-10-16 PROCEDURE — 93005 ELECTROCARDIOGRAM TRACING: CPT

## 2023-10-16 NOTE — DISCHARGE INSTRUCTIONS
Blood pressure in ER was stable. Continue to take lisinopril as prescribed.    Continue to monitor blood pressure at least twice daily, at the same time each day. Bring log to primary care physician for continued management and possible medication adjustment.  Recommend calling primary care provider as soon as possible for management of this problem.      Return to ER with any worsening symptoms.

## 2023-10-16 NOTE — ED PROVIDER NOTES
"Encounter Date: 10/16/2023       History     Chief Complaint   Patient presents with    Hypertension     Brought per [facility] for c/o high blood pressure today even though she has taken her BP meds this am. Also reports "my whole body feels funny and heavy"     See MDM for details     The history is provided by the patient.     Review of patient's allergies indicates:   Allergen Reactions    Codeine Itching and Swelling     Past Medical History:   Diagnosis Date    Anxiety disorder, unspecified     Asthma     Depression     Hypercholesteremia     Hypertension     Opioid withdrawal      Past Surgical History:   Procedure Laterality Date     SECTION       Family History   Problem Relation Age of Onset    Depression Mother     COPD Mother     Hypertension Mother     Cancer Mother     Hypertension Father     Heart disease Father      Social History     Tobacco Use    Smoking status: Never    Smokeless tobacco: Never   Substance Use Topics    Alcohol use: Not Currently    Drug use: Not Currently     Types: Marijuana, Methamphetamines, Cocaine     Comment: past- opioid- clean for 7 years     Review of Systems   Constitutional:  Positive for fatigue. Negative for chills and fever.   Respiratory:  Positive for shortness of breath. Negative for cough.    Cardiovascular:  Positive for chest pain. Negative for palpitations.   Neurological:  Positive for headaches. Negative for dizziness, syncope and weakness.   All other systems reviewed and are negative.      Physical Exam     Initial Vitals [10/16/23 1236]   BP Pulse Resp Temp SpO2   (!) 144/89 81 18 97.9 °F (36.6 °C) 100 %      MAP       --         Physical Exam    Nursing note and vitals reviewed.  Constitutional: She appears well-developed and well-nourished. No distress.   HENT:   Head: Normocephalic and atraumatic.   Eyes: Conjunctivae and EOM are normal.   Neck: Neck supple.   Normal range of motion.  Cardiovascular:  Normal rate, regular rhythm and normal heart " sounds.           Pulmonary/Chest: Breath sounds normal. No respiratory distress. She exhibits no tenderness.   Abdominal: Abdomen is soft.   Musculoskeletal:         General: Normal range of motion.      Cervical back: Normal range of motion and neck supple.     Neurological: She is alert and oriented to person, place, and time. GCS score is 15. GCS eye subscore is 4. GCS verbal subscore is 5. GCS motor subscore is 6.   Skin: Skin is warm and dry. Capillary refill takes less than 2 seconds.   Psychiatric: She has a normal mood and affect. Thought content normal.         ED Course   Procedures  Labs Reviewed - No data to display  EKG Readings: (Independently Interpreted)   Initial Reading: No STEMI. Rhythm: Normal Sinus Rhythm. Heart Rate: 65. Ectopy: No Ectopy. Conduction: Normal.   NSR     ECG Results              EKG 12-lead (In process)  Result time 10/16/23 14:06:46      In process by Interface, Lab In [facility] (10/16/23 14:06:46)                   Narrative:    Test Reason : R07.9,    Vent. Rate : 065 BPM     Atrial Rate : 065 BPM     P-R Int : 162 ms          QRS Dur : 092 ms      QT Int : 392 ms       P-R-T Axes : 048 046 040 degrees     QTc Int : 407 ms    Normal sinus rhythm  Cannot rule out Anterior infarct ,age undetermined  Abnormal ECG  When compared with ECG of 11-SEP-2023 14:20,  No significant change was found    Referred By: [email or web address]   SELF           Confirmed By:                                   Imaging Results              X-Ray Chest 1 View (Final result)  Result time 10/16/23 14:38:27      Final result by Jamshid Kinney MD (10/16/23 14:38:27)                   Impression:      1. No active cardiopulmonary disease identified      Electronically signed by: Jamshid Kinney  Date:    10/16/2023  Time:    14:38               Narrative:    EXAMINATION:  XR CHEST 1 VIEW    CLINICAL HISTORY:  chest pain;, .    COMPARISON:  09/11/2023    FINDINGS:  An AP view or more reveals the heart to be normal in  size.  The trachea is midline.  No definite infiltrate or effusion is seen.  Bony structures appear grossly intact.                        Wet Read by Patricia Nichole PA (10/16/23 13:58:11, [facility] - Emergency Dept, Emergency Medicine)    No acute cardiopulmonary abnormalities                                     Medications - No data to display  Medical Decision Making  38-year-old female presents to the ER for evaluation of intermittent, episodic hypertension with associated feelings of chest pain and shortness of breath x3 days.  Patient reports history of hypertension for which she takes lisinopril 10 mg once daily.  She reports she is been taking her medications as prescribed.  She reports onset episodic, self-resolving chest pain or shortness of breath with episodes of hypertension.  Hypertension at home has been documented at 170/120 she reports that the episodes last about 5-10 minutes and are self-resolving.  In triage, her blood pressure was elevated, but not emergent.  I time of my assessment her blood pressure was 133/88 and she denied any current symptoms.  She reports that she sees a nurse practitioner as her primary care physician and has an appointment with them tomorrow for follow-up.  Patient denies any vision changes.  She denies any numbness tingling or weakness.    EKG and chest x-ray were ordered for evaluation of in her mentation, episodic chest pain and shortness of breath.  EKG normal sinus rhythm with normal rate, no signs of STEMI.  Chest x-ray without acute cardiopulmonary abnormalities.  Discussed findings with patient and she verbalized her understanding.  Recommend that she continue to check her blood pressure at home and log it for her PCP to manage.  Recommend that she continues take her lisinopril as prescribed until she sees her PCP.  Return to ER precautions were given.  Patient was stable.  Patient had no complaints.  Patient was discharged home.    Amount and/or  Complexity of Data Reviewed  Radiology: ordered and independent interpretation performed. Decision-making details documented in ED Course.  ECG/medicine tests: ordered and independent interpretation performed.     Details: See EKG      Additional MDM:   Differential Diagnosis:   Other: The following diagnoses were also considered and will be evaluated: MI, Costochondritis and Hypertension.            ED Course as of 10/16/23 1500   Mon Oct 16, 2023   1358 Chest x-ray without any acute cardiopulmonary abnormalities [LM]      ED Course User Index  [LM] Patricia Nichole PA                    Clinical Impression:   Final diagnoses:  [R07.9] Chest pain  [I10] Hypertension, unspecified type (Primary)        ED Disposition Condition    Discharge Stable          ED Prescriptions    None       Follow-up Information       Follow up With Specialties Details Why Contact Info    Federica Elias, FNP Family Medicine, Emergency Medicine Call in 1 day To follow-up for episodic hypertension and possible medication management [address] [place] [address]  [phone]               Nichole, Patricia, PA  10/16/23 1500

## 2023-10-17 ENCOUNTER — HOSPITAL ENCOUNTER (EMERGENCY)
Facility: HOSPITAL | Age: 38
Discharge: HOME | End: 2023-10-17
Attending: STUDENT IN AN ORGANIZED HEALTH CARE EDUCATION/TRAINING PROGRAM
Payer: MEDICAID

## 2023-10-17 VITALS
TEMPERATURE: 98 F | RESPIRATION RATE: 18 BRPM | HEART RATE: 82 BPM | OXYGEN SATURATION: 98 % | BODY MASS INDEX: 33.63 KG/M2 | SYSTOLIC BLOOD PRESSURE: 130 MMHG | DIASTOLIC BLOOD PRESSURE: 82 MMHG | WEIGHT: 150 LBS

## 2023-10-17 DIAGNOSIS — F41.9 ANXIETY: Primary | ICD-10-CM

## 2023-10-17 LAB
ALBUMIN SERPL-MCNC: 4 G/DL (ref 3.5–5)
ALBUMIN/GLOB SERPL: 1.1 RATIO (ref 1.1–2)
ALP SERPL-CCNC: 49 UNIT/L (ref 40–150)
ALT SERPL-CCNC: 12 UNIT/L (ref 0–55)
AMPHET UR QL SCN: NEGATIVE
APPEARANCE UR: CLEAR
AST SERPL-CCNC: 19 UNIT/L (ref 5–34)
B-HCG SERPL QL: NEGATIVE
BACTERIA #/AREA URNS AUTO: ABNORMAL /HPF
BARBITURATE SCN PRESENT UR: NEGATIVE
BASOPHILS # BLD AUTO: 0.03 X10(3)/MCL
BASOPHILS NFR BLD AUTO: 0.3 %
BENZODIAZ UR QL SCN: NEGATIVE
BILIRUB SERPL-MCNC: 0.2 MG/DL
BILIRUB UR QL STRIP.AUTO: NEGATIVE
BUN SERPL-MCNC: 10 MG/DL (ref 7–18.7)
CALCIUM SERPL-MCNC: 9.6 MG/DL (ref 8.4–10.2)
CANNABINOIDS UR QL SCN: NEGATIVE
CHLORIDE SERPL-SCNC: 103 MMOL/L (ref 98–107)
CO2 SERPL-SCNC: 26 MMOL/L (ref 22–29)
COCAINE UR QL SCN: NEGATIVE
COLOR UR AUTO: YELLOW
CREAT SERPL-MCNC: 0.82 MG/DL (ref 0.55–1.02)
EOSINOPHIL # BLD AUTO: 0.56 X10(3)/MCL (ref 0–0.9)
EOSINOPHIL NFR BLD AUTO: 5.1 %
ERYTHROCYTE [DISTWIDTH] IN BLOOD BY AUTOMATED COUNT: 12.7 % (ref 11.5–17)
ETHANOL SERPL-MCNC: <10 MG/DL
FENTANYL UR QL SCN: NEGATIVE
GFR SERPLBLD CREATININE-BSD FMLA CKD-EPI: >60 MLS/MIN/1.73/M2
GLOBULIN SER-MCNC: 3.5 GM/DL (ref 2.4–3.5)
GLUCOSE SERPL-MCNC: 111 MG/DL (ref 74–100)
GLUCOSE UR QL STRIP.AUTO: NEGATIVE
HCT VFR BLD AUTO: 36 % (ref 37–47)
HGB BLD-MCNC: 11.4 G/DL (ref 12–16)
IMM GRANULOCYTES # BLD AUTO: 0.07 X10(3)/MCL (ref 0–0.04)
IMM GRANULOCYTES NFR BLD AUTO: 0.6 %
KETONES UR QL STRIP.AUTO: ABNORMAL
LEUKOCYTE ESTERASE UR QL STRIP.AUTO: NEGATIVE
LYMPHOCYTES # BLD AUTO: 2 X10(3)/MCL (ref 0.6–4.6)
LYMPHOCYTES NFR BLD AUTO: 18.3 %
MCH RBC QN AUTO: 28.9 PG (ref 27–31)
MCHC RBC AUTO-ENTMCNC: 31.7 G/DL (ref 33–36)
MCV RBC AUTO: 91.4 FL (ref 80–94)
MDMA UR QL SCN: NEGATIVE
MONOCYTES # BLD AUTO: 0.68 X10(3)/MCL (ref 0.1–1.3)
MONOCYTES NFR BLD AUTO: 6.2 %
MUCOUS THREADS URNS QL MICRO: ABNORMAL /LPF
NEUTROPHILS # BLD AUTO: 7.57 X10(3)/MCL (ref 2.1–9.2)
NEUTROPHILS NFR BLD AUTO: 69.5 %
NITRITE UR QL STRIP.AUTO: NEGATIVE
OPIATES UR QL SCN: NEGATIVE
PCP UR QL: NEGATIVE
PH UR STRIP.AUTO: 6.5 [PH]
PH UR: 6.5 [PH] (ref 3–11)
PLATELET # BLD AUTO: 283 X10(3)/MCL (ref 130–400)
PMV BLD AUTO: 8.5 FL (ref 7.4–10.4)
POTASSIUM SERPL-SCNC: 3.9 MMOL/L (ref 3.5–5.1)
PROT SERPL-MCNC: 7.5 GM/DL (ref 6.4–8.3)
PROT UR QL STRIP.AUTO: ABNORMAL
RBC # BLD AUTO: 3.94 X10(6)/MCL (ref 4.2–5.4)
RBC #/AREA URNS AUTO: ABNORMAL /HPF
RBC UR QL AUTO: ABNORMAL
SODIUM SERPL-SCNC: 140 MMOL/L (ref 136–145)
SP GR UR STRIP.AUTO: 1.02 (ref 1–1.03)
SQUAMOUS #/AREA URNS AUTO: ABNORMAL /HPF
UROBILINOGEN UR STRIP-ACNC: 0.2
WBC # SPEC AUTO: 10.91 X10(3)/MCL (ref 4.5–11.5)
WBC #/AREA URNS AUTO: ABNORMAL /HPF

## 2023-10-17 PROCEDURE — 81025 URINE PREGNANCY TEST: CPT | Performed by: STUDENT IN AN ORGANIZED HEALTH CARE EDUCATION/TRAINING PROGRAM

## 2023-10-17 PROCEDURE — 82077 ASSAY SPEC XCP UR&BREATH IA: CPT | Performed by: STUDENT IN AN ORGANIZED HEALTH CARE EDUCATION/TRAINING PROGRAM

## 2023-10-17 PROCEDURE — 99283 EMERGENCY DEPT VISIT LOW MDM: CPT

## 2023-10-17 PROCEDURE — 80053 COMPREHEN METABOLIC PANEL: CPT | Performed by: STUDENT IN AN ORGANIZED HEALTH CARE EDUCATION/TRAINING PROGRAM

## 2023-10-17 PROCEDURE — 80307 DRUG TEST PRSMV CHEM ANLYZR: CPT | Performed by: STUDENT IN AN ORGANIZED HEALTH CARE EDUCATION/TRAINING PROGRAM

## 2023-10-17 PROCEDURE — 81001 URINALYSIS AUTO W/SCOPE: CPT | Mod: XB | Performed by: STUDENT IN AN ORGANIZED HEALTH CARE EDUCATION/TRAINING PROGRAM

## 2023-10-17 PROCEDURE — 85025 COMPLETE CBC W/AUTO DIFF WBC: CPT | Performed by: STUDENT IN AN ORGANIZED HEALTH CARE EDUCATION/TRAINING PROGRAM

## 2023-10-17 RX ORDER — HYDROXYZINE PAMOATE 25 MG/1
25 CAPSULE ORAL EVERY 6 HOURS PRN
Qty: 20 CAPSULE | Refills: 0 | Status: SHIPPED | OUTPATIENT
Start: 2023-10-17

## 2023-10-18 NOTE — ED PROVIDER NOTES
Encounter Date: 10/17/2023       History     Chief Complaint   Patient presents with    Anxiety     Reports continued dizziness, extremity numbness, and upset stomach. Seen in this ED yesterday and PCP today. Dx with anxiety and given cymbalta rx.      HPI    38-year-old female with a past medical history of opiate abuse, hypertension, hyperlipidemia and anxiety/depression presents emergency department for vague symptoms today.  Patient states that she has intermittent dizziness with both upper lower extremity numbness and tingling that is intermittent as well as episodes of nausea and feeling like she is getting hot and cold.  States it has been going on for last few days.  Was seen in the ER yesterday.  Went to PCP this morning and was diagnosed with anxiety and was given some Cymbalta.  Patient states that she is concerned that some eye is missing something in something is going wrong with interbody.  No fevers, chest pain or current abdominal pain.  No burning on urination.  No headaches.  No current dizziness.  Review of patient's allergies indicates:   Allergen Reactions    Codeine Itching and Swelling     Past Medical History:   Diagnosis Date    Anxiety disorder, unspecified     Asthma     Depression     Hypercholesteremia     Hypertension     Opioid withdrawal      Past Surgical History:   Procedure Laterality Date     SECTION       Family History   Problem Relation Age of Onset    Depression Mother     COPD Mother     Hypertension Mother     Cancer Mother     Hypertension Father     Heart disease Father      Social History     Tobacco Use    Smoking status: Never    Smokeless tobacco: Never   Substance Use Topics    Alcohol use: Not Currently    Drug use: Not Currently     Types: Marijuana, Methamphetamines, Cocaine     Comment: past- opioid- clean for 7 years     Review of Systems   Constitutional:  Positive for chills and fatigue. Negative for fever.   HENT:  Negative for sore throat.     Respiratory:  Negative for cough and shortness of breath.    Cardiovascular:  Negative for chest pain.   Gastrointestinal:  Positive for abdominal pain and nausea. Negative for constipation, diarrhea and vomiting.   Genitourinary:  Negative for dysuria.   Musculoskeletal:  Negative for back pain.   Skin:  Negative for rash.   Neurological:  Positive for light-headedness and numbness. Negative for weakness and headaches.   Hematological:  Does not bruise/bleed easily.   Psychiatric/Behavioral:  The patient is nervous/anxious.    All other systems reviewed and are negative.      Physical Exam     Initial Vitals [10/17/23 2157]   BP Pulse Resp Temp SpO2   (!) 144/85 101 16 98.1 °F (36.7 °C) 100 %      MAP       --         Physical Exam    Nursing note and vitals reviewed.  Constitutional: She appears well-developed and well-nourished. No distress.   Cardiovascular:  Normal rate and regular rhythm.           Pulmonary/Chest: Breath sounds normal. No respiratory distress. She has no wheezes. She has no rhonchi. She has no rales.   Abdominal: Abdomen is soft. There is no abdominal tenderness. There is no rebound and no guarding.   Musculoskeletal:         General: No tenderness. Normal range of motion.     Neurological: She is alert and oriented to person, place, and time. She has normal strength.   Skin: Skin is warm. Capillary refill takes less than 2 seconds.   Psychiatric: Her mood appears anxious.         ED Course   Procedures  Labs Reviewed   COMPREHENSIVE METABOLIC PANEL - Abnormal; Notable for the following components:       Result Value    Glucose Level 111 (*)     All other components within normal limits   URINALYSIS, REFLEX TO URINE CULTURE - Abnormal; Notable for the following components:    Protein, UA 1+ (*)     Ketones, UA Trace (*)     Blood, UA Trace-Intact (*)     All other components within normal limits   CBC WITH DIFFERENTIAL - Abnormal; Notable for the following components:    RBC 3.94 (*)      Hgb 11.4 (*)     Hct 36.0 (*)     MCHC 31.7 (*)     IG# 0.07 (*)     All other components within normal limits   URINALYSIS, MICROSCOPIC - Abnormal; Notable for the following components:    Mucous, UA Trace (*)     Squamous Epithelial Cells, UA Moderate (*)     All other components within normal limits   PREGNANCY TEST, URINE RAPID - Normal   DRUG SCREEN, URINE (BEAKER) - Normal    Narrative:     Cut off concentrations:    Amphetamines - 1000 ng/ml  Barbiturates - 200 ng/ml  Benzodiazepine - 200 ng/ml  Cannabinoids (THC) - 50 ng/ml  Cocaine - 300 ng/ml  Fentanyl - 1.0 ng/ml  MDMA - 500 ng/ml  Opiates - 300 ng/ml   Phencyclidine (PCP) - 25 ng/ml    Specimen submitted for drug analysis and tested for pH and specific gravity in order to evaluate sample integrity. Suspect tampering if specific gravity is <1.003 and/or pH is not within the range of 4.5 - 8.0  False negatives may result form substances such as bleach added to urine.  False positives may result for the presence of a substance with similar chemical structure to the drug or its metabolite.    This test provides only a PRELIMINARY analytical test result. A more specific alternate chemical method must be used in order to obtain a confirmed analytical result. Gas chromatography/mass spectrometry (GC/MS) is the preferred confirmatory method. Other chemical confirmation methods are available. Clinical consideration and professional judgement should be applied to any drug of abuse test result, particularly when preliminary positive results are used.    Positive results will be confirmed only at the physicians request. Unconfirmed screening results are to be used only for medical purposes (treatment).        ALCOHOL,MEDICAL (ETHANOL) - Normal   CBC W/ AUTO DIFFERENTIAL    Narrative:     The following orders were created for panel order CBC auto differential.  Procedure                               Abnormality         Status                     ---------                                -----------         ------                     CBC with Differential[[id number]]       Abnormal            Final result                 Please view results for these tests on the individual orders.          Imaging Results    None          Medications - No data to display  Medical Decision Making  Patient presents with a constellation of symptoms.  High likelihood of simple anxiety although will obtain basic blood work.  There is no indication for a CT of the head as this intermittent dizziness and sensation of tingling happens in constellation with each other and it is intermittent.  She has no neurological deficits.  Patient states that she feels very anxious when these episodes happened.  She is very concerned that somebody is missing a root cause of the issue.  Patient grateful that we are obtaining lab work.  Denies any active symptoms.    differential diagnosis  Anxiety, metabolic derangement, anemia, drug abuse, intracranial abnormality,  as well as multiple other possible etiologies      There is no current indication for CT.  We will continue to monitor.    Problems Addressed:  Anxiety: chronic illness or injury    Amount and/or Complexity of Data Reviewed  External Data Reviewed: notes.  Labs: ordered. Decision-making details documented in ED Course.    Risk  Prescription drug management.               ED Course as of 10/17/23 2310   Tue Oct 17, 2023   2211 Leukocytes, UA: Negative [BS]   2211 NITRITE UA: Negative [BS]   2219 Preg Test, Ur: Negative [BS]   2226 WBC: 10.91 [BS]   2226 Hemoglobin(!): 11.4 [BS]   2226 Hematocrit(!): 36.0 [BS]   2226 Platelet Count: 283 [BS]   2309 All labs normal.  Patient states she is fine just anxious.  Will discharge p.r.n. hydroxyzine [BS]      ED Course User Index  [BS] Derian Morrell MD                    Clinical Impression:   Final diagnoses:  [F41.9] Anxiety (Primary)        ED Disposition Condition    Discharge Stable          ED  Prescriptions       Medication Sig Dispense Start Date End Date Auth. Provider    hydrOXYzine pamoate (VISTARIL) 25 MG Cap Take 1 capsule (25 mg total) by mouth every 6 (six) hours as needed (Anxiety). 20 capsule 10/17/2023 -- Derian Morrell MD          Follow-up Information       Follow up With Specialties Details Why Contact Info    Federica Elias, [facility] Family Medicine, Emergency Medicine Schedule an appointment as soon as possible for a visit   [address] [place] [address]  [phone]      [facility] - Emergency Dept Emergency Medicine Go to  If symptoms worsen [address] [facility] [id number] [phone]             Derian Morrell MD  10/17/23 3473

## 2024-01-06 ENCOUNTER — HOSPITAL ENCOUNTER (EMERGENCY)
Facility: HOSPITAL | Age: 39
Discharge: HOME | End: 2024-01-06
Attending: STUDENT IN AN ORGANIZED HEALTH CARE EDUCATION/TRAINING PROGRAM
Payer: MEDICAID

## 2024-01-06 VITALS
OXYGEN SATURATION: 100 % | HEART RATE: 84 BPM | DIASTOLIC BLOOD PRESSURE: 93 MMHG | SYSTOLIC BLOOD PRESSURE: 154 MMHG | TEMPERATURE: 98 F | RESPIRATION RATE: 20 BRPM | BODY MASS INDEX: 33.63 KG/M2 | WEIGHT: 150 LBS

## 2024-01-06 DIAGNOSIS — I10 HYPERTENSION, UNSPECIFIED TYPE: Primary | ICD-10-CM

## 2024-01-06 PROCEDURE — 99283 EMERGENCY DEPT VISIT LOW MDM: CPT

## 2024-01-07 NOTE — ED PROVIDER NOTES
Encounter Date: 2024       History     Chief Complaint   Patient presents with    Hypertension     States she has been feeling like her blood pressure has been going up and down over the last few days. Hx of HTN, compliant with medication      HPI    38-year-old female with a past medical history listed below presents emergency department for elevated blood pressure.  Patient concerned because her relative  yesterday because of high blood pressure.  She checked her blood pressure today and it was noted to be elevated so came in the checked.  Denies any symptoms.    Review of patient's allergies indicates:   Allergen Reactions    Codeine Itching and Swelling     Past Medical History:   Diagnosis Date    Anxiety disorder, unspecified     Asthma     Depression     Hypercholesteremia     Hypertension     Opioid withdrawal      Past Surgical History:   Procedure Laterality Date     SECTION       Family History   Problem Relation Age of Onset    Depression Mother     COPD Mother     Hypertension Mother     Cancer Mother     Hypertension Father     Heart disease Father      Social History     Tobacco Use    Smoking status: Never    Smokeless tobacco: Never   Substance Use Topics    Alcohol use: Not Currently    Drug use: Not Currently     Types: Marijuana, Methamphetamines, Cocaine     Comment: past- opioid- clean for 7 years     Review of Systems   Constitutional:  Negative for fever.   HENT:  Negative for sore throat.    Respiratory:  Negative for cough and shortness of breath.    Cardiovascular:  Negative for chest pain.   Gastrointestinal:  Negative for abdominal pain, constipation, diarrhea, nausea and vomiting.   Genitourinary:  Negative for dysuria.   Musculoskeletal:  Negative for back pain.   Skin:  Negative for rash.   Neurological:  Negative for weakness and headaches.   Hematological:  Does not bruise/bleed easily.   All other systems reviewed and are negative.      Physical Exam     Initial  Vitals [01/06/24 2150]   BP Pulse Resp Temp SpO2   (!) 157/91 95 18 98.6 °F (37 °C) 98 %      MAP       --         Physical Exam    Nursing note and vitals reviewed.  Constitutional: She appears well-developed and well-nourished. She is Obese . No distress.   Cardiovascular:  Normal rate and regular rhythm.           Pulmonary/Chest: Breath sounds normal. No respiratory distress. She has no wheezes. She has no rhonchi. She has no rales.   Abdominal: Abdomen is soft. There is no abdominal tenderness. There is no rebound and no guarding.   Musculoskeletal:         General: No tenderness. Normal range of motion.     Neurological: She is alert and oriented to person, place, and time. She has normal strength.   Skin: Skin is warm. Capillary refill takes less than 2 seconds.   Psychiatric: Her mood appears anxious.         ED Course   Procedures  Labs Reviewed - No data to display       Imaging Results    None          Medications - No data to display  Medical Decision Making  differential diagnosis  Hypertension, anxiety,  as well as multiple other possible etiologies      Problems Addressed:  Hypertension, unspecified type: chronic illness or injury    Amount and/or Complexity of Data Reviewed  External Data Reviewed: labs and notes.                                      Clinical Impression:  Final diagnoses:  [I10] Hypertension, unspecified type (Primary)          ED Disposition Condition    Discharge Stable          ED Prescriptions    None       Follow-up Information       Follow up With Specialties Details Why Contact Info    Federica Elias FNP Family Medicine, Emergency Medicine Schedule an appointment as soon as possible for a visit   [address] [place] [place] [address]  [phone]      [facility]  Emergency Dept Emergency Medicine Go to  If symptoms worsen [address] [facility] [id number] [phone]             Derian Morrell MD  01/06/24 5835

## 2024-01-21 ENCOUNTER — HOSPITAL ENCOUNTER (EMERGENCY)
Facility: HOSPITAL | Age: 39
Discharge: HOME | End: 2024-01-21
Attending: EMERGENCY MEDICINE
Payer: MEDICAID

## 2024-01-21 VITALS
SYSTOLIC BLOOD PRESSURE: 139 MMHG | DIASTOLIC BLOOD PRESSURE: 70 MMHG | OXYGEN SATURATION: 98 % | HEIGHT: 56 IN | RESPIRATION RATE: 20 BRPM | TEMPERATURE: 99 F | BODY MASS INDEX: 33.74 KG/M2 | HEART RATE: 72 BPM | WEIGHT: 150 LBS

## 2024-01-21 DIAGNOSIS — F41.9 ANXIETY: Primary | ICD-10-CM

## 2024-01-21 DIAGNOSIS — F51.01 PRIMARY INSOMNIA: ICD-10-CM

## 2024-01-21 PROCEDURE — 99283 EMERGENCY DEPT VISIT LOW MDM: CPT

## 2024-01-21 RX ORDER — HYDROXYZINE PAMOATE 50 MG/1
50 CAPSULE ORAL NIGHTLY PRN
Qty: 20 CAPSULE | Refills: 0 | Status: SHIPPED | OUTPATIENT
Start: 2024-01-21

## 2024-01-21 NOTE — ED PROVIDER NOTES
Encounter Date: 2024       History     Chief Complaint   Patient presents with    Anxiety     Pt states she feels like she is having an anxiety attack and was unable to sleep last night.     The patient presents with anxiety and insomnia.  She feels like her blood pressure is fluctuating.  She is having trouble sleeping.  This is an ongoing problem.  She was prescribed Vistaril some time ago but ran out.        Review of patient's allergies indicates:   Allergen Reactions    Codeine Itching and Swelling     Past Medical History:   Diagnosis Date    Anxiety disorder, unspecified     Asthma     Depression     Hypercholesteremia     Hypertension     Opioid withdrawal      Past Surgical History:   Procedure Laterality Date     SECTION       Family History   Problem Relation Age of Onset    Depression Mother     COPD Mother     Hypertension Mother     Cancer Mother     Hypertension Father     Heart disease Father      Social History     Tobacco Use    Smoking status: Never    Smokeless tobacco: Never   Substance Use Topics    Alcohol use: Not Currently    Drug use: Not Currently     Types: Marijuana, Methamphetamines, Cocaine     Comment: past- opioid- clean for 7 years     Review of Systems   All other systems reviewed and are negative.      Physical Exam     Initial Vitals [24 0906]   BP Pulse Resp Temp SpO2   (!) 143/68 77 20 98.6 °F (37 °C) 99 %      MAP       --         Physical Exam    Constitutional:   Vital signs reviewed.  Nursing note reviewed.    General:  The patient is awake and alert, appropriate and interactive.    Eyes: Conjunctiva are clear.  Corneas appear normal.  EOMI.  ENT: Face, head, external nose, and ears are normal-appearing.  The oropharynx appears normal.  The uvula is midline.  The posterior pharyngeal elements are symmetric.  Voice is normal.  Neck: The neck is nontender and supple with normal range of motion.  Back: The back appears normal with full range of  motion.  Respiratory: The respiratory effort is normal.  The lungs are clear to auscultation.  Cardiovascular: Heart sounds are normal.  No murmur or rub.  The rate is normal.  The rhythm is normal.  Peripheral pulses are normal and equal bilaterally.  No edema is present.  Capillary refill is less than 3 seconds.  GI: The abdomen is soft, nondistended, without mass.  There is no tenderness to palpation.  No rebound or guarding.  Bowel sounds are normal.  The liver and spleen are nonpalpable.  Musculoskeletal: Range of motion of all joints appears normal without pain or tenderness.  Skin: There is no rash.  Neurologic: No focal deficits are noted.  Psych:  Anxious.           ED Course   Procedures  Labs Reviewed - No data to display       Imaging Results    None          Medications - No data to display  Medical Decision Making  The patient has been resting comfortably during her ED stay.  I will prescribe her Vistaril to use at night.  She will follow up with her PCP.                                      Clinical Impression:  Final diagnoses:  [F41.9] Anxiety (Primary)  [F51.01] Primary insomnia          ED Disposition Condition    Discharge Stable          ED Prescriptions       Medication Sig Dispense Start Date End Date Auth. Provider    hydrOXYzine pamoate (VISTARIL) 50 MG Cap Take 1 capsule (50 mg total) by mouth nightly as needed (Insomnia). 20 capsule 1/21/2024 -- Chencho Rodriguez MD          Follow-up Information       Follow up With Specialties Details Why Contact Info    Federica Elias FNP Family Medicine, Emergency Medicine  Follow-up in 3-4 days for recheck. [address]  [place] [address]  [phone]               Chencho Rodriguez MD  01/21/24 8679

## 2024-06-30 ENCOUNTER — HOSPITAL ENCOUNTER (EMERGENCY)
Facility: HOSPITAL | Age: 39
Discharge: HOME | End: 2024-06-30
Attending: INTERNAL MEDICINE
Payer: MEDICAID

## 2024-06-30 VITALS
TEMPERATURE: 98 F | SYSTOLIC BLOOD PRESSURE: 139 MMHG | DIASTOLIC BLOOD PRESSURE: 74 MMHG | BODY MASS INDEX: 38.83 KG/M2 | WEIGHT: 180 LBS | OXYGEN SATURATION: 100 % | HEART RATE: 100 BPM | RESPIRATION RATE: 18 BRPM | HEIGHT: 57 IN

## 2024-06-30 DIAGNOSIS — T14.8XXA MUSCLE STRAIN: Primary | ICD-10-CM

## 2024-06-30 PROCEDURE — 99283 EMERGENCY DEPT VISIT LOW MDM: CPT

## 2024-06-30 RX ORDER — AMLODIPINE BESYLATE 5 MG/1
5 TABLET ORAL DAILY
COMMUNITY
Start: 2024-03-21

## 2024-06-30 RX ORDER — DULOXETIN HYDROCHLORIDE 20 MG/1
20 CAPSULE, DELAYED RELEASE ORAL 2 TIMES DAILY
COMMUNITY
Start: 2024-06-11

## 2024-06-30 RX ORDER — ATORVASTATIN CALCIUM 20 MG/1
20 TABLET, FILM COATED ORAL NIGHTLY
COMMUNITY
Start: 2024-06-05

## 2024-06-30 NOTE — ED PROVIDER NOTES
Encounter Date: 2024  History from patient     History     Chief Complaint   Patient presents with    Leg Pain     Pt brought in by [facility] with gladys thigh pain starting at 0230 this am.     HPI    Trena Acosta is 39 y.o. female who  has a past medical history of Anxiety disorder, unspecified, Asthma, Depression, Hypercholesteremia, Hypertension, and Opioid withdrawal. arrives in ER with c/o Leg Pain (Pt brought in by [facility] with gladys thigh pain starting at 0230 this am.)      Review of patient's allergies indicates:   Allergen Reactions    Codeine Itching and Swelling     Past Medical History:   Diagnosis Date    Anxiety disorder, unspecified     Asthma     Depression     Hypercholesteremia     Hypertension     Opioid withdrawal      Past Surgical History:   Procedure Laterality Date     SECTION       Family History   Problem Relation Name Age of Onset    Depression Mother      COPD Mother      Hypertension Mother      Cancer Mother      Hypertension Father      Heart disease Father       Social History     Tobacco Use    Smoking status: Never    Smokeless tobacco: Never   Substance Use Topics    Alcohol use: Not Currently    Drug use: Not Currently     Types: Marijuana, Methamphetamines, Cocaine     Comment: past- opioid- clean for 7 years     Review of Systems   Constitutional:  Negative for fever.   HENT:  Negative for trouble swallowing and voice change.    Eyes:  Negative for visual disturbance.   Respiratory:  Negative for cough and shortness of breath.    Cardiovascular:  Negative for chest pain.   Gastrointestinal:  Negative for abdominal pain, diarrhea and vomiting.   Genitourinary:  Negative for dysuria and hematuria.   Musculoskeletal:  Negative for back pain and gait problem.        Bilateral anterior and posterior thigh pain, no pain in the calves, no pain in the feet some back pain   Skin:  Negative for color change and rash.   Neurological:  Negative for headaches.    Psychiatric/Behavioral:  Negative for behavioral problems and sleep disturbance.    All other systems reviewed and are negative.      Physical Exam     Initial Vitals [06/30/24 0540]   BP Pulse Resp Temp SpO2   139/74 100 18 98.4 °F (36.9 °C) 100 %      MAP       --         Physical Exam    Nursing note and vitals reviewed.  Constitutional: She appears well-developed and well-nourished. No distress.   HENT:   Head: Atraumatic.   Eyes: EOM are normal.   Cardiovascular:  Normal rate and regular rhythm.           Pulmonary/Chest: Breath sounds normal. No respiratory distress. She has no wheezes.   Abdominal: Abdomen is soft. Bowel sounds are normal. She exhibits no distension. There is no abdominal tenderness.   Musculoskeletal:         General: Normal range of motion.        Legs:      Neurological: She is alert and oriented to person, place, and time.   Skin: Skin is warm and dry.   Psychiatric: She has a normal mood and affect.         ED Course   Procedures  Labs Reviewed - No data to display       Imaging Results    None          Medications - No data to display  Medical Decision Making    Trena Acosta is 39 y.o. female who  has a past medical history of Anxiety disorder, unspecified, Asthma, Depression, Hypercholesteremia, Hypertension, and Opioid withdrawal. arrives in ER with c/o Leg Pain (Pt brought in by [facility] with gladys thigh pain starting at 0230 this am.)    Patient woke up with the bilateral thigh and hamstrings area pain, she says she took Motrin Benadryl and her anxiety medicine but did not work, so she decided to call the ambulance to come to the emergency room.  Patient says her father had blood clot in the leg and he had thigh pain,  On questioning patient says that she was dry heaving, she also had some diarrhea during the night, denies any other complaints.    Patient says that she has been clean for 7 years, she is taking her Suboxone but she usually takes it at around 11 does not take it  in the morning.  Denies any other complaints whatsoever    Patient has good bilateral capillary refill, posterior tibial pulses are 1+ bilaterally, dorsalis pedis 1+ bilaterally, there is no calf tenderness, there is no swelling on the extremities, there is no edema on the extremities, there are no skin changes, mainly just tenderness in the quadriceps and hamstrings area, I advised patient that because she was cleaning her house so possibly she has strained her muscles, she should continue taking Motrin and Suboxone and Motrin with some Tylenol should help it out, she got a give it a couple of days and see from there.  She can always come back in case she develops any other symptoms.                                      Clinical Impression:  Final diagnoses:  [T14.8XXA] Muscle strain (Primary)          ED Disposition Condition    Discharge Stable          ED Prescriptions    None       Follow-up Information       Follow up With Specialties Details Why Contact Info    Federica Elias, [facility] Family Medicine, Emergency Medicine In 2 days  [address] [place] [address]  [phone]               Tres Booker MD  06/30/24 0603

## 2025-03-26 ENCOUNTER — HOSPITAL ENCOUNTER (EMERGENCY)
Facility: HOSPITAL | Age: 40
Discharge: HOME | End: 2025-03-26
Attending: FAMILY MEDICINE
Payer: MEDICAID

## 2025-03-26 VITALS
DIASTOLIC BLOOD PRESSURE: 85 MMHG | HEIGHT: 56 IN | BODY MASS INDEX: 38.69 KG/M2 | TEMPERATURE: 99 F | HEART RATE: 100 BPM | WEIGHT: 172 LBS | SYSTOLIC BLOOD PRESSURE: 145 MMHG | RESPIRATION RATE: 20 BRPM | OXYGEN SATURATION: 100 %

## 2025-03-26 DIAGNOSIS — R50.9 FEVER, UNSPECIFIED FEVER CAUSE: Primary | ICD-10-CM

## 2025-03-26 LAB
FLUAV AG UPPER RESP QL IA.RAPID: NOT DETECTED
FLUBV AG UPPER RESP QL IA.RAPID: NOT DETECTED
RSV A 5' UTR RNA NPH QL NAA+PROBE: NOT DETECTED
SARS-COV-2 RNA RESP QL NAA+PROBE: NOT DETECTED

## 2025-03-26 PROCEDURE — 99283 EMERGENCY DEPT VISIT LOW MDM: CPT

## 2025-03-26 PROCEDURE — 0241U COVID/RSV/FLU A&B PCR: CPT | Performed by: NURSE PRACTITIONER

## 2025-03-26 PROCEDURE — 25000003 PHARM REV CODE 250: Performed by: NURSE PRACTITIONER

## 2025-03-26 RX ORDER — ACETAMINOPHEN 325 MG/1
650 TABLET ORAL
Status: COMPLETED | OUTPATIENT
Start: 2025-03-26 | End: 2025-03-26

## 2025-03-26 RX ADMIN — ACETAMINOPHEN 650 MG: 325 TABLET, FILM COATED ORAL at 06:03

## 2025-03-26 NOTE — ED PROVIDER NOTES
Encounter Date: 3/26/2025       History     Chief Complaint   Patient presents with    Fever    Generalized Body Aches     Fever, body aches, and headache that started yesterday     See MDM    The history is provided by the patient. No  was used.     Review of patient's allergies indicates:   Allergen Reactions    Codeine Itching and Swelling     Past Medical History:   Diagnosis Date    Anxiety disorder, unspecified     Asthma     Depression     Hypercholesteremia     Hypertension     Opioid withdrawal      Past Surgical History:   Procedure Laterality Date     SECTION       Family History   Problem Relation Name Age of Onset    Depression Mother      COPD Mother      Hypertension Mother      Cancer Mother      Hypertension Father      Heart disease Father       Social History[1]  Review of Systems   Constitutional:  Positive for fever.   Respiratory:  Negative for cough and shortness of breath.    Cardiovascular:  Negative for chest pain.   Gastrointestinal:  Negative for abdominal pain.   Genitourinary:  Negative for difficulty urinating and dysuria.   Musculoskeletal:  Positive for myalgias. Negative for gait problem.   Skin:  Negative for color change.   Neurological:  Positive for headaches. Negative for dizziness and speech difficulty.   Psychiatric/Behavioral:  Negative for hallucinations and suicidal ideas.    All other systems reviewed and are negative.      Physical Exam     Initial Vitals [25 1749]   BP Pulse Resp Temp SpO2   (!) 150/67 100 18 (!) 101.9 °F (38.8 °C) 99 %      MAP       --         Physical Exam    Nursing note and vitals reviewed.  Constitutional: She appears well-developed and well-nourished.   HENT:   Head: Normocephalic.   Eyes: EOM are normal.   Neck:   Normal range of motion.  Cardiovascular:  Normal rate, regular rhythm, normal heart sounds and intact distal pulses.           Pulmonary/Chest: Breath sounds normal. No respiratory distress.    Abdominal: Abdomen is soft. Bowel sounds are normal. There is no abdominal tenderness.   Musculoskeletal:         General: Normal range of motion.      Cervical back: Normal range of motion.     Neurological: She is alert and oriented to person, place, and time. She has normal strength.   Skin: Skin is warm and dry.   Psychiatric: She has a normal mood and affect. Her behavior is normal. Judgment and thought content normal.         ED Course   Procedures  Labs Reviewed   COVID/RSV/FLU A&B PCR - Normal       Result Value    Influenza A PCR Not Detected      Influenza B PCR Not Detected      Respiratory Syncytial Virus PCR Not Detected      SARS-CoV-2 PCR Not Detected      Narrative:     The Xpert Xpress SARS-CoV-2/FLU/RSV plus is a rapid, multiplexed real-time PCR test intended for the simultaneous qualitative detection and differentiation of SARS-CoV-2, Influenza A, Influenza B, and respiratory syncytial virus (RSV) viral RNA in either nasopharyngeal swab or nasal swab specimens.                Imaging Results    None          Medications   acetaminophen tablet 650 mg (650 mg Oral Given 3/26/25 1806)     Medical Decision Making  Historian:  Patient.  Patient is a White 39 y.o. female that presents with headache, body aches, fever that has been present yesterday. Associated symptoms nothing. Surrounding information is nothing. Exacerbated by nothing. Relieved by nothing. Patient treatment prior to arrival none. Risk factors include none. Other history pertaining to this complaint nothing.   Assessment:  See physical exam.  DD:  COVID, flu, RSV, viral illness  ED Course: History was obtained.  Physical was performed.  Patient feels better after Tylenol.  She had no meningeal signs.  Did offer to do lab work which she declined. Medical or surgical consults:  None. Social determinants that affect healthcare:  None.       Amount and/or Complexity of Data Reviewed  Labs:      Details: Negative COVID flu RSV    Risk  OTC  drugs.  Risk Details: Alternate ibuprofen and Tylenol                                      Clinical Impression:  Final diagnoses:  [R50.9] Fever, unspecified fever cause (Primary)          ED Disposition Condition    Discharge Stable          ED Prescriptions    None       Follow-up Information       Follow up With Specialties Details Why Contact Info    Your Primary Care Provider  Call in 3 days                   [1]   Social History  Tobacco Use    Smoking status: Never    Smokeless tobacco: Never   Substance Use Topics    Alcohol use: Not Currently    Drug use: Not Currently     Types: Marijuana, Methamphetamines, Cocaine     Comment: past- opioid- clean for 7 years        Fish Paez, FNP  03/26/25 6206

## 2025-04-17 ENCOUNTER — HOSPITAL ENCOUNTER (EMERGENCY)
Facility: HOSPITAL | Age: 40
Discharge: HOME | End: 2025-04-17
Attending: INTERNAL MEDICINE
Payer: MEDICAID

## 2025-04-17 VITALS
HEIGHT: 57 IN | OXYGEN SATURATION: 98 % | WEIGHT: 171 LBS | HEART RATE: 88 BPM | DIASTOLIC BLOOD PRESSURE: 79 MMHG | BODY MASS INDEX: 36.89 KG/M2 | RESPIRATION RATE: 18 BRPM | SYSTOLIC BLOOD PRESSURE: 142 MMHG | TEMPERATURE: 98 F

## 2025-04-17 DIAGNOSIS — R50.9 FEVER: Primary | ICD-10-CM

## 2025-04-17 LAB
ALBUMIN SERPL-MCNC: 3.6 G/DL (ref 3.5–5)
ALBUMIN/GLOB SERPL: 0.8 RATIO (ref 1.1–2)
ALP SERPL-CCNC: 81 UNIT/L (ref 40–150)
ALT SERPL-CCNC: 18 UNIT/L (ref 0–55)
ANION GAP SERPL CALC-SCNC: 6 MEQ/L
AST SERPL-CCNC: 21 UNIT/L (ref 11–45)
B-HCG UR QL: NEGATIVE
BACTERIA #/AREA URNS AUTO: NORMAL /HPF
BASOPHILS # BLD AUTO: 0.03 X10(3)/MCL
BASOPHILS NFR BLD AUTO: 0.5 %
BILIRUB SERPL-MCNC: 0.2 MG/DL
BILIRUB UR QL STRIP.AUTO: NEGATIVE
BUN SERPL-MCNC: 11 MG/DL (ref 7–18.7)
CALCIUM SERPL-MCNC: 8.6 MG/DL (ref 8.4–10.2)
CHLORIDE SERPL-SCNC: 104 MMOL/L (ref 98–107)
CLARITY UR: CLEAR
CO2 SERPL-SCNC: 26 MMOL/L (ref 22–29)
COLOR UR AUTO: YELLOW
CREAT SERPL-MCNC: 0.95 MG/DL (ref 0.55–1.02)
CREAT/UREA NIT SERPL: 12
EOSINOPHIL # BLD AUTO: 0.11 X10(3)/MCL (ref 0–0.9)
EOSINOPHIL NFR BLD AUTO: 2 %
ERYTHROCYTE [DISTWIDTH] IN BLOOD BY AUTOMATED COUNT: 12.8 % (ref 11.5–17)
FLUAV AG UPPER RESP QL IA.RAPID: NOT DETECTED
FLUBV AG UPPER RESP QL IA.RAPID: NOT DETECTED
GFR SERPLBLD CREATININE-BSD FMLA CKD-EPI: >60 ML/MIN/1.73/M2
GLOBULIN SER-MCNC: 4.3 GM/DL (ref 2.4–3.5)
GLUCOSE SERPL-MCNC: 97 MG/DL (ref 74–100)
GLUCOSE UR QL STRIP: NEGATIVE
HCT VFR BLD AUTO: 33.4 % (ref 37–47)
HGB BLD-MCNC: 11.3 G/DL (ref 12–16)
HGB UR QL STRIP: ABNORMAL
IMM GRANULOCYTES # BLD AUTO: 0.09 X10(3)/MCL (ref 0–0.04)
IMM GRANULOCYTES NFR BLD AUTO: 1.6 %
KETONES UR QL STRIP: NEGATIVE
LEUKOCYTE ESTERASE UR QL STRIP: NEGATIVE
LYMPHOCYTES # BLD AUTO: 0.7 X10(3)/MCL (ref 0.6–4.6)
LYMPHOCYTES NFR BLD AUTO: 12.5 %
MCH RBC QN AUTO: 28 PG (ref 27–31)
MCHC RBC AUTO-ENTMCNC: 33.8 G/DL (ref 33–36)
MCV RBC AUTO: 82.9 FL (ref 80–94)
MONOCYTES # BLD AUTO: 0.45 X10(3)/MCL (ref 0.1–1.3)
MONOCYTES NFR BLD AUTO: 8 %
NEUTROPHILS # BLD AUTO: 4.23 X10(3)/MCL (ref 2.1–9.2)
NEUTROPHILS NFR BLD AUTO: 75.4 %
NITRITE UR QL STRIP: NEGATIVE
NRBC BLD AUTO-RTO: 0 %
PH UR STRIP: 6.5 [PH]
PLATELET # BLD AUTO: 254 X10(3)/MCL (ref 130–400)
PMV BLD AUTO: 8.5 FL (ref 7.4–10.4)
POTASSIUM SERPL-SCNC: 4 MMOL/L (ref 3.5–5.1)
PROT SERPL-MCNC: 7.9 GM/DL (ref 6.4–8.3)
PROT UR QL STRIP: NEGATIVE
RBC # BLD AUTO: 4.03 X10(6)/MCL (ref 4.2–5.4)
RBC #/AREA URNS AUTO: NORMAL /HPF
RSV A 5' UTR RNA NPH QL NAA+PROBE: NOT DETECTED
SARS-COV-2 RNA RESP QL NAA+PROBE: NOT DETECTED
SODIUM SERPL-SCNC: 136 MMOL/L (ref 136–145)
SP GR UR STRIP.AUTO: 1.02 (ref 1–1.03)
SQUAMOUS #/AREA URNS AUTO: NORMAL /HPF
UROBILINOGEN UR STRIP-ACNC: 0.2
WBC # BLD AUTO: 5.61 X10(3)/MCL (ref 4.5–11.5)
WBC #/AREA URNS AUTO: NORMAL /HPF

## 2025-04-17 PROCEDURE — 80053 COMPREHEN METABOLIC PANEL: CPT | Performed by: PHYSICIAN ASSISTANT

## 2025-04-17 PROCEDURE — 0241U COVID/RSV/FLU A&B PCR: CPT | Performed by: PHYSICIAN ASSISTANT

## 2025-04-17 PROCEDURE — 81025 URINE PREGNANCY TEST: CPT | Performed by: PHYSICIAN ASSISTANT

## 2025-04-17 PROCEDURE — 85025 COMPLETE CBC W/AUTO DIFF WBC: CPT | Performed by: PHYSICIAN ASSISTANT

## 2025-04-17 PROCEDURE — 99284 EMERGENCY DEPT VISIT MOD MDM: CPT | Mod: 25

## 2025-04-17 PROCEDURE — 81003 URINALYSIS AUTO W/O SCOPE: CPT | Performed by: PHYSICIAN ASSISTANT

## 2025-04-17 RX ORDER — KETOROLAC TROMETHAMINE 10 MG/1
10 TABLET, FILM COATED ORAL EVERY 6 HOURS
Qty: 20 TABLET | Refills: 0 | Status: SHIPPED | OUTPATIENT
Start: 2025-04-17 | End: 2025-04-22

## 2025-04-17 NOTE — DISCHARGE INSTRUCTIONS
May take Toradol for pain and fever.  Alternate with Tylenol.  Follow up with PCP in 3-5 days as needed

## 2025-04-17 NOTE — ED PROVIDER NOTES
Encounter Date: 2025       History     Chief Complaint   Patient presents with    Generalized Body Aches     C/o body aches and fever. Concerned she has the flu or walking pneumonia     39-year-old female presents to ED for evaluation of generalized body aches and fever intermittently over 3 weeks.  Patient denies any cough or congestion.  Denies any sore throat, abdominal pain, nausea vomiting or diarrhea.  States there was a recent outbreak at her [employer] for walking pneumonia.  Patient states that her temperature goes up and down throughout the day.  Took Tylenol 1 hour prior to arrival.  Denies any shortness of breath or chest pain.    The history is provided by the patient. No  was used.     Review of patient's allergies indicates:   Allergen Reactions    Codeine Itching and Swelling     Past Medical History:   Diagnosis Date    Anxiety disorder, unspecified     Asthma     Depression     Hypercholesteremia     Hypertension     Opioid withdrawal      Past Surgical History:   Procedure Laterality Date     SECTION       Family History   Problem Relation Name Age of Onset    Depression Mother      COPD Mother      Hypertension Mother      Cancer Mother      Hypertension Father      Heart disease Father       Social History[1]  Review of Systems   Constitutional:  Positive for chills, fatigue and fever.   HENT:  Negative for congestion, ear pain, rhinorrhea and sore throat.    Respiratory:  Negative for cough, shortness of breath and wheezing.    Cardiovascular:  Negative for chest pain.   Gastrointestinal:  Negative for abdominal pain, nausea and vomiting.   Musculoskeletal:  Positive for myalgias.   Neurological:  Negative for headaches.   All other systems reviewed and are negative.      Physical Exam     Initial Vitals [25 1458]   BP Pulse Resp Temp SpO2   (!) 146/88 92 20 99 °F (37.2 °C) 100 %      MAP       --         Physical Exam    Nursing note and vitals  reviewed.  Constitutional: She appears well-developed and well-nourished.   HENT:   Head: Normocephalic and atraumatic.   Right Ear: Tympanic membrane and external ear normal.   Left Ear: Tympanic membrane and external ear normal. Mouth/Throat: Uvula is midline, oropharynx is clear and moist and mucous membranes are normal. No trismus in the jaw. No uvula swelling. No oropharyngeal exudate, posterior oropharyngeal edema or posterior oropharyngeal erythema.   Eyes: Conjunctivae are normal. Pupils are equal, round, and reactive to light.   Neck: Neck supple.   Normal range of motion.  Cardiovascular:  Normal rate, regular rhythm and normal heart sounds.           Pulmonary/Chest: Breath sounds normal. She has no wheezes. She has no rhonchi. She has no rales.   Abdominal: Abdomen is soft. Bowel sounds are normal. There is no abdominal tenderness.   Musculoskeletal:         General: Normal range of motion.      Cervical back: Normal range of motion and neck supple.     Neurological: She is alert and oriented to person, place, and time. She has normal strength. No cranial nerve deficit or sensory deficit. GCS score is 15. GCS eye subscore is 4. GCS verbal subscore is 5. GCS motor subscore is 6.   Skin: Skin is warm and dry.   Psychiatric: She has a normal mood and affect.         ED Course   Procedures  Labs Reviewed   URINALYSIS, REFLEX TO URINE CULTURE - Abnormal       Result Value    Color, UA Yellow      Appearance, UA Clear      Specific Gravity, UA 1.020      pH, UA 6.5      Protein, UA Negative      Glucose, UA Negative      Ketones, UA Negative      Blood, UA 2+ (*)     Bilirubin, UA Negative      Urobilinogen, UA 0.2      Nitrites, UA Negative      Leukocyte Esterase, UA Negative     COMPREHENSIVE METABOLIC PANEL - Abnormal    Sodium 136      Potassium 4.0      Chloride 104      CO2 26      Glucose 97      Blood Urea Nitrogen 11.0      Creatinine 0.95      Calcium 8.6      Protein Total 7.9      Albumin 3.6       Globulin 4.3 (*)     Albumin/Globulin Ratio 0.8 (*)     Bilirubin Total 0.2      ALP 81      ALT 18      AST 21      eGFR >60      Anion Gap 6.0      BUN/Creatinine Ratio 12     CBC WITH DIFFERENTIAL - Abnormal    WBC 5.61      RBC 4.03 (*)     Hgb 11.3 (*)     Hct 33.4 (*)     MCV 82.9      MCH 28.0      MCHC 33.8      RDW 12.8      Platelet 254      MPV 8.5      Neut % 75.4      Lymph % 12.5      Mono % 8.0      Eos % 2.0      Basophil % 0.5      Imm Grans % 1.6      Neut # 4.23      Lymph # 0.70      Mono # 0.45      Eos # 0.11      Baso # 0.03      Imm Gran # 0.09 (*)     NRBC% 0.0     COVID/RSV/FLU A&B PCR - Normal    Influenza A PCR Not Detected      Influenza B PCR Not Detected      Respiratory Syncytial Virus PCR Not Detected      SARS-CoV-2 PCR Not Detected      Narrative:     The Xpert Xpress SARS-CoV-2/FLU/RSV plus is a rapid, multiplexed real-time PCR test intended for the simultaneous qualitative detection and differentiation of SARS-CoV-2, Influenza A, Influenza B, and respiratory syncytial virus (RSV) viral RNA in either nasopharyngeal swab or nasal swab specimens.         PREGNANCY TEST, URINE RAPID - Normal    hCG Qualitative, Urine Negative     URINALYSIS, MICROSCOPIC - Normal    Bacteria, UA None Seen      RBC, UA 0-2      WBC, UA None Seen      Squamous Epithelial Cells, UA Occasional     CBC W/ AUTO DIFFERENTIAL    Narrative:     The following orders were created for panel order CBC auto differential.  Procedure                               Abnormality         Status                     ---------                               -----------         ------                     CBC with Differential[[id number]]       Abnormal            Final result                 Please view results for these tests on the individual orders.          Imaging Results              X-Ray Chest PA And Lateral (Final result)  Result time 04/17/25 16:43:50      Final result by Jamshid Kinney MD (04/17/25 16:43:50)                    Impression:      1. No active cardiopulmonary disease identified      Electronically signed by: Jamshid Kinney  Date:    04/17/2025  Time:    16:43               Narrative:    EXAMINATION:  XR CHEST PA AND LATERAL    CLINICAL HISTORY:  , Fever, unspecified.    COMPARISON:  10/16/2023    FINDINGS:  PA/AP and lateral views reveal the heart to be normal in size.  The trachea is midline.  No definite infiltrate or effusion is seen.  Degenerative changes are noted to the thoracic spine.                        Wet Read by Kassy Ramon PA (04/17/25 16:37:30, [facility] - Emergency Dept, Emergency Medicine)    No acute findings noted.                                      Medications - No data to display  Medical Decision Making  39-year-old female presents to ED for evaluation of generalized body aches and fever intermittently over 3 weeks.  Patient denies any cough or congestion.  Denies any sore throat, abdominal pain, nausea vomiting or diarrhea.  States there was a recent outbreak at her [employer] for walking pneumonia.  Patient states that her temperature goes up and down throughout the day.  Took Tylenol 1 hour prior to arrival.  Denies any shortness of breath or chest pain.    Differential diagnosis includes but isn't limited to Viral syndrome, COVID, flu, strep, sinusitis, bronchitis, pneumonia, UTI, mycoplasma      Amount and/or Complexity of Data Reviewed  Labs: ordered. Decision-making details documented in ED Course.  Radiology: ordered and independent interpretation performed.  Discussion of management or test interpretation with external provider(s): Patient reports that she has been having intermittent body aches chills and fever over the last several weeks.  States that it waxes and wanes.  Reports that she started having fever with body aches again yesterday.  Denies any cough, congestion, sore throat, abdominal pain, nausea vomiting or diarrhea.  Viral swabs obtained here  negative.  UA negative for UTI.  White blood cell count normal at 5.6.  H&H stable.  Electrolytes normal.  Chest x-ray negative for any acute findings.  No indication for antibiotics.  Discussed symptomatic care.  Patient verbalizes understanding and agrees with plan of care.    Risk  OTC drugs.               ED Course as of 04/17/25 1647   Thu Apr 17, 2025   1555 SARS-CoV2 (COVID-19) Qualitative PCR: Not Detected [SL]   1555 RSV Ag by Molecular Method: Not Detected [SL]   1555 Influenza B, Molecular: Not Detected [SL]   1555 Influenza A, Molecular: Not Detected [SL]   1555 hCG Qualitative, Urine: Negative [SL]   1555 Blood, UA(!): 2+  Currently on menstrual cycle [SL]   1607 WBC: 5.61 [SL]   1607 Hemoglobin(!): 11.3 [SL]   1607 Hematocrit(!): 33.4 [SL]      ED Course User Index  [SL] Kassy Ramon PA                           Clinical Impression:  Final diagnoses:  [R50.9] Fever          ED Disposition Condition    Discharge Stable          ED Prescriptions       Medication Sig Dispense Start Date End Date Auth. Provider    ketorolac (TORADOL) 10 mg tablet Take 1 tablet (10 mg total) by mouth every 6 (six) hours. for 5 days 20 tablet 4/17/2025 4/22/2025 Kassy Ramon PA          Follow-up Information       Follow up With Specialties Details Why Contact Info    Federica Elias, [facility], Emergency Medicine   [address] [place] [address]  [phone]                 [1]   Social History  Tobacco Use    Smoking status: Never    Smokeless tobacco: Never   Substance Use Topics    Alcohol use: Not Currently    Drug use: Not Currently     Types: Marijuana, Methamphetamines, Cocaine     Comment: past- opioid- clean for 7 years        Kassy Ramon PA  04/17/25 2880